# Patient Record
Sex: FEMALE | Race: BLACK OR AFRICAN AMERICAN | NOT HISPANIC OR LATINO | URBAN - METROPOLITAN AREA
[De-identification: names, ages, dates, MRNs, and addresses within clinical notes are randomized per-mention and may not be internally consistent; named-entity substitution may affect disease eponyms.]

---

## 2023-02-03 PROBLEM — G47.00 INSOMNIA: Status: ACTIVE | Noted: 2023-02-03

## 2023-02-03 PROBLEM — R69 SICK: Status: ACTIVE | Noted: 2023-02-03

## 2023-02-03 PROBLEM — R73.09 ABNORMAL GLUCOSE: Status: ACTIVE | Noted: 2023-02-03

## 2023-02-03 PROBLEM — E55.9 VITAMIN D DEFICIENCY: Status: ACTIVE | Noted: 2023-02-03

## 2023-02-03 PROBLEM — J30.9 ALLERGIC RHINITIS: Status: ACTIVE | Noted: 2023-02-03

## 2023-02-03 PROBLEM — J45.909 ASTHMA (HHS-HCC): Status: ACTIVE | Noted: 2023-02-03

## 2023-02-03 PROBLEM — E03.9 HYPOTHYROIDISM: Status: ACTIVE | Noted: 2023-02-03

## 2023-02-03 PROBLEM — J44.9 COPD (CHRONIC OBSTRUCTIVE PULMONARY DISEASE) (MULTI): Status: ACTIVE | Noted: 2023-02-03

## 2023-02-03 PROBLEM — E66.9 CLASS 1 OBESITY WITH BODY MASS INDEX (BMI) OF 34.0 TO 34.9 IN ADULT: Status: ACTIVE | Noted: 2023-02-03

## 2023-02-03 PROBLEM — J45.20 MILD INTERMITTENT ASTHMA WITHOUT COMPLICATION (HHS-HCC): Status: ACTIVE | Noted: 2023-02-03

## 2023-02-03 PROBLEM — E66.811 CLASS 1 OBESITY WITH BODY MASS INDEX (BMI) OF 34.0 TO 34.9 IN ADULT: Status: ACTIVE | Noted: 2023-02-03

## 2023-02-03 PROBLEM — E53.8 VITAMIN B12 DEFICIENCY: Status: ACTIVE | Noted: 2023-02-03

## 2023-02-03 PROBLEM — I10 BENIGN ESSENTIAL HYPERTENSION: Status: ACTIVE | Noted: 2023-02-03

## 2023-02-03 RX ORDER — ACETAMINOPHEN 500 MG
125 TABLET ORAL DAILY
COMMUNITY
End: 2023-03-16 | Stop reason: DRUGHIGH

## 2023-02-03 RX ORDER — LORATADINE 10 MG/1
10 TABLET ORAL DAILY
COMMUNITY
End: 2023-03-16 | Stop reason: ALTCHOICE

## 2023-02-03 RX ORDER — FLUTICASONE FUROATE AND VILANTEROL 100; 25 UG/1; UG/1
1 POWDER RESPIRATORY (INHALATION) DAILY
COMMUNITY
Start: 2021-08-24 | End: 2023-03-16 | Stop reason: ALTCHOICE

## 2023-02-03 RX ORDER — ALBUTEROL SULFATE 90 UG/1
AEROSOL, METERED RESPIRATORY (INHALATION) EVERY 6 HOURS PRN
COMMUNITY
End: 2023-03-16 | Stop reason: ALTCHOICE

## 2023-02-03 RX ORDER — LEVOCETIRIZINE DIHYDROCHLORIDE 5 MG/1
1 TABLET, FILM COATED ORAL DAILY
COMMUNITY
Start: 2016-04-20 | End: 2023-03-16 | Stop reason: SDUPTHER

## 2023-02-03 RX ORDER — ALBUTEROL SULFATE 0.83 MG/ML
2.5 SOLUTION RESPIRATORY (INHALATION)
COMMUNITY
End: 2023-03-16 | Stop reason: ALTCHOICE

## 2023-02-03 RX ORDER — MONTELUKAST SODIUM 10 MG/1
1 TABLET ORAL NIGHTLY
COMMUNITY
Start: 2020-11-30 | End: 2023-03-16 | Stop reason: SDUPTHER

## 2023-02-03 RX ORDER — VERAPAMIL HYDROCHLORIDE 180 MG/1
2 TABLET, FILM COATED, EXTENDED RELEASE ORAL DAILY
COMMUNITY
Start: 2014-04-22 | End: 2023-03-16 | Stop reason: SDUPTHER

## 2023-02-03 RX ORDER — CYANOCOBALAMIN 1000 UG/ML
INJECTION, SOLUTION INTRAMUSCULAR; SUBCUTANEOUS
COMMUNITY
End: 2023-03-16 | Stop reason: ALTCHOICE

## 2023-02-03 RX ORDER — FLUTICASONE PROPIONATE 50 MCG
SPRAY, SUSPENSION (ML) NASAL
COMMUNITY
Start: 2021-08-24 | End: 2023-03-16 | Stop reason: ALTCHOICE

## 2023-02-03 RX ORDER — LEVOTHYROXINE SODIUM 75 UG/1
1 TABLET ORAL DAILY
COMMUNITY
Start: 2021-08-24 | End: 2023-03-16 | Stop reason: SDUPTHER

## 2023-02-03 RX ORDER — BUDESONIDE AND FORMOTEROL FUMARATE DIHYDRATE 80; 4.5 UG/1; UG/1
2 AEROSOL RESPIRATORY (INHALATION)
COMMUNITY
End: 2023-03-16 | Stop reason: DRUGHIGH

## 2023-03-10 LAB
ALANINE AMINOTRANSFERASE (SGPT) (U/L) IN SER/PLAS: 27 U/L (ref 7–45)
ALBUMIN (G/DL) IN SER/PLAS: 4.2 G/DL (ref 3.4–5)
ALKALINE PHOSPHATASE (U/L) IN SER/PLAS: 72 U/L (ref 33–110)
ANION GAP IN SER/PLAS: 9 MMOL/L (ref 10–20)
ASPARTATE AMINOTRANSFERASE (SGOT) (U/L) IN SER/PLAS: 22 U/L (ref 9–39)
BASOPHILS (10*3/UL) IN BLOOD BY AUTOMATED COUNT: 0.04 X10E9/L (ref 0–0.1)
BASOPHILS/100 LEUKOCYTES IN BLOOD BY AUTOMATED COUNT: 0.5 % (ref 0–2)
BILIRUBIN TOTAL (MG/DL) IN SER/PLAS: 0.6 MG/DL (ref 0–1.2)
CALCIDIOL (25 OH VITAMIN D3) (NG/ML) IN SER/PLAS: 45 NG/ML
CALCIUM (MG/DL) IN SER/PLAS: 8.8 MG/DL (ref 8.6–10.3)
CARBON DIOXIDE, TOTAL (MMOL/L) IN SER/PLAS: 32 MMOL/L (ref 21–32)
CHLORIDE (MMOL/L) IN SER/PLAS: 104 MMOL/L (ref 98–107)
CHOLESTEROL (MG/DL) IN SER/PLAS: 215 MG/DL (ref 0–199)
CHOLESTEROL IN HDL (MG/DL) IN SER/PLAS: 63.6 MG/DL
CHOLESTEROL/HDL RATIO: 3.4
COBALAMIN (VITAMIN B12) (PG/ML) IN SER/PLAS: 736 PG/ML (ref 211–911)
CREATININE (MG/DL) IN SER/PLAS: 0.62 MG/DL (ref 0.5–1.05)
EOSINOPHILS (10*3/UL) IN BLOOD BY AUTOMATED COUNT: 0.16 X10E9/L (ref 0–0.7)
EOSINOPHILS/100 LEUKOCYTES IN BLOOD BY AUTOMATED COUNT: 2 % (ref 0–6)
ERYTHROCYTE DISTRIBUTION WIDTH (RATIO) BY AUTOMATED COUNT: 13.8 % (ref 11.5–14.5)
ERYTHROCYTE MEAN CORPUSCULAR HEMOGLOBIN CONCENTRATION (G/DL) BY AUTOMATED: 30 G/DL (ref 32–36)
ERYTHROCYTE MEAN CORPUSCULAR VOLUME (FL) BY AUTOMATED COUNT: 88 FL (ref 80–100)
ERYTHROCYTES (10*6/UL) IN BLOOD BY AUTOMATED COUNT: 4.98 X10E12/L (ref 4–5.2)
GFR FEMALE: >90 ML/MIN/1.73M2
GLUCOSE (MG/DL) IN SER/PLAS: 93 MG/DL (ref 74–99)
HEMATOCRIT (%) IN BLOOD BY AUTOMATED COUNT: 43.7 % (ref 36–46)
HEMOGLOBIN (G/DL) IN BLOOD: 13.1 G/DL (ref 12–16)
IMMATURE GRANULOCYTES/100 LEUKOCYTES IN BLOOD BY AUTOMATED COUNT: 0.4 % (ref 0–0.9)
LDL: 131 MG/DL (ref 0–99)
LEUKOCYTES (10*3/UL) IN BLOOD BY AUTOMATED COUNT: 8.2 X10E9/L (ref 4.4–11.3)
LYMPHOCYTES (10*3/UL) IN BLOOD BY AUTOMATED COUNT: 1.77 X10E9/L (ref 1.2–4.8)
LYMPHOCYTES/100 LEUKOCYTES IN BLOOD BY AUTOMATED COUNT: 21.7 % (ref 13–44)
MONOCYTES (10*3/UL) IN BLOOD BY AUTOMATED COUNT: 0.5 X10E9/L (ref 0.1–1)
MONOCYTES/100 LEUKOCYTES IN BLOOD BY AUTOMATED COUNT: 6.1 % (ref 2–10)
NEUTROPHILS (10*3/UL) IN BLOOD BY AUTOMATED COUNT: 5.65 X10E9/L (ref 1.2–7.7)
NEUTROPHILS/100 LEUKOCYTES IN BLOOD BY AUTOMATED COUNT: 69.3 % (ref 40–80)
PLATELETS (10*3/UL) IN BLOOD AUTOMATED COUNT: 195 X10E9/L (ref 150–450)
POTASSIUM (MMOL/L) IN SER/PLAS: 4.1 MMOL/L (ref 3.5–5.3)
PROTEIN TOTAL: 6.8 G/DL (ref 6.4–8.2)
SODIUM (MMOL/L) IN SER/PLAS: 141 MMOL/L (ref 136–145)
THYROTROPIN (MIU/L) IN SER/PLAS BY DETECTION LIMIT <= 0.05 MIU/L: 1.11 MIU/L (ref 0.44–3.98)
TRIGLYCERIDE (MG/DL) IN SER/PLAS: 102 MG/DL (ref 0–149)
UREA NITROGEN (MG/DL) IN SER/PLAS: 10 MG/DL (ref 6–23)
VLDL: 20 MG/DL (ref 0–40)

## 2023-03-16 ENCOUNTER — OFFICE VISIT (OUTPATIENT)
Dept: PRIMARY CARE | Facility: CLINIC | Age: 53
End: 2023-03-16
Payer: COMMERCIAL

## 2023-03-16 VITALS
WEIGHT: 196 LBS | TEMPERATURE: 97.3 F | BODY MASS INDEX: 33.46 KG/M2 | SYSTOLIC BLOOD PRESSURE: 122 MMHG | HEART RATE: 76 BPM | OXYGEN SATURATION: 95 % | RESPIRATION RATE: 16 BRPM | HEIGHT: 64 IN | DIASTOLIC BLOOD PRESSURE: 78 MMHG

## 2023-03-16 DIAGNOSIS — J45.20 MILD INTERMITTENT ASTHMA WITHOUT COMPLICATION (HHS-HCC): ICD-10-CM

## 2023-03-16 DIAGNOSIS — E03.9 ACQUIRED HYPOTHYROIDISM: ICD-10-CM

## 2023-03-16 DIAGNOSIS — J30.1 ALLERGIC RHINITIS DUE TO POLLEN, UNSPECIFIED SEASONALITY: ICD-10-CM

## 2023-03-16 DIAGNOSIS — E66.09 CLASS 1 OBESITY DUE TO EXCESS CALORIES WITH SERIOUS COMORBIDITY AND BODY MASS INDEX (BMI) OF 33.0 TO 33.9 IN ADULT: ICD-10-CM

## 2023-03-16 DIAGNOSIS — E78.5 HYPERLIPIDEMIA WITH TARGET LOW DENSITY LIPOPROTEIN (LDL) CHOLESTEROL LESS THAN 100 MG/DL: ICD-10-CM

## 2023-03-16 DIAGNOSIS — E74.39 GLUCOSE INTOLERANCE: ICD-10-CM

## 2023-03-16 DIAGNOSIS — L28.0 LICHENOID DERMATITIS: ICD-10-CM

## 2023-03-16 DIAGNOSIS — E55.9 VITAMIN D DEFICIENCY: ICD-10-CM

## 2023-03-16 DIAGNOSIS — E53.8 VITAMIN B12 DEFICIENCY: ICD-10-CM

## 2023-03-16 DIAGNOSIS — I10 BENIGN ESSENTIAL HYPERTENSION: Primary | ICD-10-CM

## 2023-03-16 PROBLEM — G47.00 INSOMNIA: Status: RESOLVED | Noted: 2023-02-03 | Resolved: 2023-03-16

## 2023-03-16 PROBLEM — E66.811 CLASS 1 OBESITY WITH BODY MASS INDEX (BMI) OF 34.0 TO 34.9 IN ADULT: Status: RESOLVED | Noted: 2023-02-03 | Resolved: 2023-03-16

## 2023-03-16 PROBLEM — R73.09 ABNORMAL GLUCOSE: Status: RESOLVED | Noted: 2023-02-03 | Resolved: 2023-03-16

## 2023-03-16 PROBLEM — J44.9 COPD (CHRONIC OBSTRUCTIVE PULMONARY DISEASE) (MULTI): Status: RESOLVED | Noted: 2023-02-03 | Resolved: 2023-03-16

## 2023-03-16 PROBLEM — E66.9 CLASS 1 OBESITY WITH BODY MASS INDEX (BMI) OF 34.0 TO 34.9 IN ADULT: Status: RESOLVED | Noted: 2023-02-03 | Resolved: 2023-03-16

## 2023-03-16 PROBLEM — R69 SICK: Status: RESOLVED | Noted: 2023-02-03 | Resolved: 2023-03-16

## 2023-03-16 LAB — POC HEMOGLOBIN A1C: 5.3 % (ref 4.2–6.5)

## 2023-03-16 PROCEDURE — 99214 OFFICE O/P EST MOD 30 MIN: CPT | Performed by: FAMILY MEDICINE

## 2023-03-16 PROCEDURE — 3078F DIAST BP <80 MM HG: CPT | Performed by: FAMILY MEDICINE

## 2023-03-16 PROCEDURE — 83036 HEMOGLOBIN GLYCOSYLATED A1C: CPT | Performed by: FAMILY MEDICINE

## 2023-03-16 PROCEDURE — 3008F BODY MASS INDEX DOCD: CPT | Performed by: FAMILY MEDICINE

## 2023-03-16 PROCEDURE — 3074F SYST BP LT 130 MM HG: CPT | Performed by: FAMILY MEDICINE

## 2023-03-16 RX ORDER — VERAPAMIL HYDROCHLORIDE 180 MG/1
360 TABLET, FILM COATED, EXTENDED RELEASE ORAL NIGHTLY
Qty: 180 TABLET | Refills: 1 | Status: SHIPPED | OUTPATIENT
Start: 2023-03-16 | End: 2023-11-27

## 2023-03-16 RX ORDER — LEVOTHYROXINE SODIUM 75 UG/1
75 TABLET ORAL
Qty: 90 TABLET | Refills: 1 | Status: SHIPPED | OUTPATIENT
Start: 2023-03-16 | End: 2023-11-29

## 2023-03-16 RX ORDER — LEVOCETIRIZINE DIHYDROCHLORIDE 5 MG/1
5 TABLET, FILM COATED ORAL EVERY EVENING
Qty: 90 TABLET | Refills: 1 | Status: SHIPPED | OUTPATIENT
Start: 2023-03-16 | End: 2023-11-29

## 2023-03-16 RX ORDER — MONTELUKAST SODIUM 10 MG/1
10 TABLET ORAL NIGHTLY
Qty: 90 TABLET | Refills: 1 | Status: SHIPPED | OUTPATIENT
Start: 2023-03-16 | End: 2023-11-29

## 2023-03-16 RX ORDER — CLOBETASOL PROPIONATE 0.5 MG/G
OINTMENT TOPICAL 2 TIMES DAILY
COMMUNITY
Start: 2023-01-16 | End: 2023-11-07 | Stop reason: ALTCHOICE

## 2023-03-16 RX ORDER — BUDESONIDE AND FORMOTEROL FUMARATE DIHYDRATE 80; 4.5 UG/1; UG/1
2 AEROSOL RESPIRATORY (INHALATION)
Qty: 1 EACH | Status: SHIPPED | COMMUNITY
Start: 2023-03-16

## 2023-03-16 RX ORDER — ACETAMINOPHEN 500 MG
5000 TABLET ORAL DAILY
Status: SHIPPED | COMMUNITY
Start: 2023-03-16

## 2023-03-16 ASSESSMENT — ENCOUNTER SYMPTOMS
EYE PAIN: 0
WOUND: 0
BLOOD IN STOOL: 0
CONSTIPATION: 0
ABDOMINAL PAIN: 0
ADENOPATHY: 0
VOMITING: 0
COUGH: 0
SINUS PAIN: 0
CHEST TIGHTNESS: 0
HEMATURIA: 0
APPETITE CHANGE: 0
EYE REDNESS: 0
OCCASIONAL FEELINGS OF UNSTEADINESS: 0
WHEEZING: 0
DEPRESSION: 0
JOINT SWELLING: 0
SINUS PRESSURE: 0
DIZZINESS: 0
DYSURIA: 0
COLOR CHANGE: 0
PALPITATIONS: 0
BRUISES/BLEEDS EASILY: 0
ARTHRALGIAS: 0
POLYPHAGIA: 0
POLYDIPSIA: 0
FACIAL SWELLING: 0
MYALGIAS: 0
ACTIVITY CHANGE: 0
BACK PAIN: 0
LOSS OF SENSATION IN FEET: 0
VOICE CHANGE: 0
UNEXPECTED WEIGHT CHANGE: 0
RHINORRHEA: 0
CHILLS: 0
NERVOUS/ANXIOUS: 0
FATIGUE: 0
SLEEP DISTURBANCE: 0
AGITATION: 0
NECK STIFFNESS: 0
EYE DISCHARGE: 0
NAUSEA: 0
EYE ITCHING: 0
SORE THROAT: 0
FREQUENCY: 0
DIARRHEA: 0
TROUBLE SWALLOWING: 0
SHORTNESS OF BREATH: 0
FEVER: 0
FLANK PAIN: 0
DIAPHORESIS: 0

## 2023-03-16 ASSESSMENT — PATIENT HEALTH QUESTIONNAIRE - PHQ9
SUM OF ALL RESPONSES TO PHQ9 QUESTIONS 1 AND 2: 0
2. FEELING DOWN, DEPRESSED OR HOPELESS: NOT AT ALL
1. LITTLE INTEREST OR PLEASURE IN DOING THINGS: NOT AT ALL

## 2023-03-16 NOTE — PROGRESS NOTES
"Subjective   Patient ID: Bren Johnson is a 52 y.o. female who presents for 6 month follow up  (Medication review/).  Today she is accompanied by alone.     HPI  Subjective:   Bren Johnson is a 52 y.o. female with hypertension and hyperlipidemia.     Current Outpatient Medications   Medication Sig Dispense Refill    clobetasol (Temovate) 0.05 % ointment Apply topically 2 times a day.      budesonide-formoteroL (Symbicort) 80-4.5 mcg/actuation inhaler Inhale 2 puffs  in the morning and 2 puffs before bedtime. 1 each     cholecalciferol (Vitamin D-3) 5,000 Units tablet Take 1 tablet (5,000 Units) by mouth once daily.      levocetirizine (Xyzal) 5 mg tablet Take 1 tablet (5 mg) by mouth once daily in the evening. 90 tablet 1    levothyroxine (Synthroid, Levoxyl) 75 mcg tablet Take 1 tablet (75 mcg) by mouth once daily in the morning. Take before meals. 90 tablet 1    montelukast (Singulair) 10 mg tablet Take 1 tablet (10 mg) by mouth once daily at bedtime. 90 tablet 1    verapamil SR (Calan-SR) 180 mg ER tablet Take 2 tablets (360 mg) by mouth once daily at bedtime. 180 tablet 1     No current facility-administered medications for this visit.      Hypertension ROS: taking medications as instructed, no medication side effects noted, no TIA's, no chest pain on exertion, no dyspnea on exertion, no swelling of ankles, no orthostatic dizziness or lightheadedness, no orthopnea or paroxysmal nocturnal dyspnea, and no palpitations.   New concerns: none.     Objective:   /78   Pulse 76   Temp 36.3 °C (97.3 °F)   Resp 16   Ht 1.626 m (5' 4\")   Wt 88.9 kg (196 lb)   SpO2 95%   BMI 33.64 kg/m²       Review of Systems   Constitutional:  Negative for activity change, appetite change, chills, diaphoresis, fatigue, fever and unexpected weight change.   HENT:  Negative for congestion, dental problem, drooling, ear discharge, ear pain, facial swelling, hearing loss, nosebleeds, postnasal drip, rhinorrhea, sinus pressure, " "sinus pain, sneezing, sore throat, tinnitus, trouble swallowing and voice change.    Eyes:  Negative for pain, discharge, redness, itching and visual disturbance.   Respiratory:  Negative for cough, chest tightness, shortness of breath and wheezing.    Cardiovascular:  Negative for chest pain, palpitations and leg swelling.   Gastrointestinal:  Negative for abdominal pain, blood in stool, constipation, diarrhea, nausea and vomiting.   Endocrine: Negative for cold intolerance, heat intolerance, polydipsia, polyphagia and polyuria.   Genitourinary:  Negative for decreased urine volume, dysuria, flank pain, frequency, hematuria and urgency.   Musculoskeletal:  Negative for arthralgias, back pain, gait problem, joint swelling, myalgias and neck stiffness.   Skin:  Negative for color change, pallor, rash and wound.   Neurological:  Negative for dizziness.   Hematological:  Negative for adenopathy. Does not bruise/bleed easily.   Psychiatric/Behavioral:  Negative for agitation, behavioral problems and sleep disturbance. The patient is not nervous/anxious.    All other systems reviewed and are negative.      Objective   /78   Pulse 76   Temp 36.3 °C (97.3 °F)   Resp 16   Ht 1.626 m (5' 4\")   Wt 88.9 kg (196 lb)   SpO2 95%   BMI 33.64 kg/m²   BSA: 2 meters squared  Growth percentiles: Facility age limit for growth %david is 20 years. Facility age limit for growth %david is 20 years.     Physical Exam  Vitals and nursing note reviewed.   Constitutional:       General: She is not in acute distress.     Appearance: Normal appearance. She is normal weight. She is not ill-appearing or toxic-appearing.   HENT:      Head: Normocephalic.      Right Ear: Tympanic membrane, ear canal and external ear normal. There is no impacted cerumen.      Left Ear: Tympanic membrane, ear canal and external ear normal. There is no impacted cerumen.      Nose: Nose normal. No congestion or rhinorrhea.      Mouth/Throat:      Mouth: " Mucous membranes are moist.      Pharynx: Oropharynx is clear. No oropharyngeal exudate or posterior oropharyngeal erythema.   Eyes:      General: No scleral icterus.        Right eye: No discharge.         Left eye: No discharge.      Extraocular Movements: Extraocular movements intact.      Conjunctiva/sclera: Conjunctivae normal.      Pupils: Pupils are equal, round, and reactive to light.   Neck:      Vascular: No carotid bruit.   Cardiovascular:      Rate and Rhythm: Normal rate and regular rhythm.      Pulses: Normal pulses.      Heart sounds: Normal heart sounds. No murmur heard.     No gallop.   Pulmonary:      Effort: No respiratory distress.      Breath sounds: No wheezing or rhonchi.   Chest:      Chest wall: No tenderness.   Abdominal:      General: Abdomen is flat. Bowel sounds are normal.      Palpations: Abdomen is soft. There is no mass.      Tenderness: There is no abdominal tenderness. There is no guarding or rebound.      Hernia: No hernia is present.   Musculoskeletal:         General: No swelling or tenderness. Normal range of motion.      Cervical back: Normal range of motion. No rigidity or tenderness.      Right lower leg: No edema.      Left lower leg: No edema.   Lymphadenopathy:      Cervical: No cervical adenopathy.   Skin:     General: Skin is warm and dry.      Coloration: Skin is not pale.      Findings: No bruising, erythema or rash.   Neurological:      General: No focal deficit present.      Mental Status: She is alert and oriented to person, place, and time.      Sensory: No sensory deficit.      Coordination: Coordination normal.      Gait: Gait normal.      Deep Tendon Reflexes: Reflexes normal.   Psychiatric:         Mood and Affect: Mood normal.         Behavior: Behavior normal.         Thought Content: Thought content normal.         Judgment: Judgment normal.         Assessment/Plan   Problem List Items Addressed This Visit       Allergic rhinitis    Relevant Medications     levocetirizine (Xyzal) 5 mg tablet    Benign essential hypertension - Primary    Relevant Medications    verapamil SR (Calan-SR) 180 mg ER tablet    Other Relevant Orders    Follow Up In [facility] - PCP    Hypothyroidism    Relevant Medications    levothyroxine (Synthroid, Levoxyl) 75 mcg tablet    Other Relevant Orders    Follow Up In [facility] - PCP    Mild intermittent asthma without complication    Relevant Medications    budesonide-formoteroL (Symbicort) 80-4.5 mcg/actuation inhaler    montelukast (Singulair) 10 mg tablet    Vitamin D deficiency    Relevant Medications    cholecalciferol (Vitamin D-3) 5,000 Units tablet    Vitamin B12 deficiency     Other Visit Diagnoses       Hyperlipidemia with target low density lipoprotein (LDL) cholesterol less than 100 mg/dL        Glucose intolerance        Relevant Orders    POCT glycosylated hemoglobin (Hb A1C) manually resulted (Completed)    Lichenoid dermatitis        Relevant Medications    clobetasol (Temovate) 0.05 % ointment    Class 1 obesity due to excess calories with serious comorbidity and body mass index (BMI) of 33.0 to 33.9 in adult            Explained importance of medications and cholesterol control. She would like to continue diet control and exercise.

## 2023-03-16 NOTE — PATIENT INSTRUCTIONS
March 16, 2023    Bren Johnson  [address] [facility] [address]      Dear Ms. Johnson:     To help you more effectively manage your high blood pressure, we would like to remind you of the following preventive measures you can take at home:    1) Eat right: Your diet should be rich in fruits, vegetables, potassium, and low-fat dairy products. You should also reduce your intake of sodium and fats, particularly saturated fats.    2) Maintain a healthy weight: Try to achieve and maintain a healthy weight. If you are unsure what this means for you, please contact our clinic.    3) Exercise: Try to get at least 30 minutes of aerobic exercise every day.    4) Moderate your alcohol consumption: Limit your alcohol intake to one drink per day.    5) Monitor your blood pressure: You should check your blood pressure regularly. Notify our clinic if your blood pressure readings are consistently higher than recommended.    We have included a personalized hypertension report card on the following page that lists your most recent blood pressure readings and lab results, along with your ideal values. If you have any questions or concerns about these instructions, your results, or your improving health, please don't hesitate to call.    Thank you for including us as members of your health care team.    [unfilled]    Sincerely,         Joyce Corey MD    Enclosure      Hypertension Report Card for Bren Johnson  March 16, 2023:     Below is a summary of recent tests related to your hypertension that can help you manage your health.     Blood Pressure:   Normal blood pressure values are 120/80 or lower. Your blood pressure values should be less than 140/90. If your readings at home are consistently higher than this, please contact me.     Your most recent blood pressure readings at our clinic were:   BP Readings from Last 3 Encounters:   03/16/23 122/78   09/22/22 118/80   03/24/22 120/88       Creatinine:   High blood  pressure can cause a loss of kidney function. Creatinine is a measure of this kidney function.      Your most recent creatinine levels were:   Creatinine (mg/dL)   Date Value   03/10/2023 0.62   12/20/2022 0.56   02/09/2022 0.66           Potassium:  Potassium is an electrolyte in your blood that can be affected by blood pressure treatment.      Your most recent potassium levels were:  Potassium (mmol/L)   Date Value   03/10/2023 4.1   12/20/2022 3.6   02/09/2022 3.8

## 2023-05-16 ENCOUNTER — TELEPHONE (OUTPATIENT)
Dept: PRIMARY CARE | Facility: CLINIC | Age: 53
End: 2023-05-16
Payer: COMMERCIAL

## 2023-05-16 DIAGNOSIS — Z29.89 NEED FOR MALARIA PROPHYLAXIS: Primary | ICD-10-CM

## 2023-05-16 RX ORDER — CHLOROQUINE PHOSPHATE 500 MG/1
TABLET, COATED ORAL
Qty: 14 TABLET | Refills: 0 | Status: SHIPPED | OUTPATIENT
Start: 2023-05-16 | End: 2023-09-26 | Stop reason: ALTCHOICE

## 2023-05-16 RX ORDER — CHLOROQUINE PHOSPHATE 500 MG/1
TABLET, COATED ORAL
Qty: 30 TABLET | Refills: 0 | Status: CANCELLED | OUTPATIENT
Start: 2023-05-16

## 2023-09-26 ENCOUNTER — OFFICE VISIT (OUTPATIENT)
Dept: PRIMARY CARE | Facility: CLINIC | Age: 53
End: 2023-09-26
Payer: COMMERCIAL

## 2023-09-26 VITALS
WEIGHT: 200.25 LBS | DIASTOLIC BLOOD PRESSURE: 96 MMHG | OXYGEN SATURATION: 94 % | BODY MASS INDEX: 34.37 KG/M2 | HEART RATE: 82 BPM | SYSTOLIC BLOOD PRESSURE: 138 MMHG

## 2023-09-26 DIAGNOSIS — Z23 NEED FOR IMMUNIZATION AGAINST INFLUENZA: ICD-10-CM

## 2023-09-26 DIAGNOSIS — J45.20 MILD INTERMITTENT ASTHMA WITHOUT COMPLICATION (HHS-HCC): ICD-10-CM

## 2023-09-26 DIAGNOSIS — I10 BENIGN ESSENTIAL HYPERTENSION: Primary | ICD-10-CM

## 2023-09-26 DIAGNOSIS — E03.9 ACQUIRED HYPOTHYROIDISM: ICD-10-CM

## 2023-09-26 DIAGNOSIS — R13.10 DYSPHAGIA, UNSPECIFIED TYPE: ICD-10-CM

## 2023-09-26 DIAGNOSIS — J30.1 ALLERGIC RHINITIS DUE TO POLLEN, UNSPECIFIED SEASONALITY: ICD-10-CM

## 2023-09-26 PROCEDURE — 3075F SYST BP GE 130 - 139MM HG: CPT | Performed by: FAMILY MEDICINE

## 2023-09-26 PROCEDURE — 3008F BODY MASS INDEX DOCD: CPT | Performed by: FAMILY MEDICINE

## 2023-09-26 PROCEDURE — 90686 IIV4 VACC NO PRSV 0.5 ML IM: CPT | Performed by: FAMILY MEDICINE

## 2023-09-26 PROCEDURE — 99214 OFFICE O/P EST MOD 30 MIN: CPT | Performed by: FAMILY MEDICINE

## 2023-09-26 PROCEDURE — 3080F DIAST BP >= 90 MM HG: CPT | Performed by: FAMILY MEDICINE

## 2023-09-26 PROCEDURE — 90471 IMMUNIZATION ADMIN: CPT | Performed by: FAMILY MEDICINE

## 2023-09-26 PROCEDURE — 1036F TOBACCO NON-USER: CPT | Performed by: FAMILY MEDICINE

## 2023-09-26 RX ORDER — HYDROCHLOROTHIAZIDE 12.5 MG/1
12.5 TABLET ORAL DAILY
Qty: 90 TABLET | Refills: 0 | Status: SHIPPED | OUTPATIENT
Start: 2023-09-26 | End: 2023-12-23 | Stop reason: SDUPTHER

## 2023-09-26 ASSESSMENT — ENCOUNTER SYMPTOMS
FEVER: 0
APPETITE CHANGE: 0
SHORTNESS OF BREATH: 0
ADENOPATHY: 0
AGITATION: 0
EYE DISCHARGE: 0
DIAPHORESIS: 0
DIZZINESS: 0
FATIGUE: 0
NAUSEA: 0
SORE THROAT: 0
RHINORRHEA: 0
CHILLS: 0
TROUBLE SWALLOWING: 0
EYE REDNESS: 0
NECK STIFFNESS: 0
POLYDIPSIA: 0
CONSTIPATION: 0
WHEEZING: 0
ABDOMINAL PAIN: 0
DYSURIA: 0
ACTIVITY CHANGE: 0
FLANK PAIN: 0
FREQUENCY: 0
UNEXPECTED WEIGHT CHANGE: 0
EYE ITCHING: 0
BRUISES/BLEEDS EASILY: 0
COLOR CHANGE: 0
EYE PAIN: 0
SINUS PAIN: 0
POLYPHAGIA: 0
BLOOD IN STOOL: 0
SINUS PRESSURE: 0
SLEEP DISTURBANCE: 0
ARTHRALGIAS: 0
DIARRHEA: 0
COUGH: 0
HEMATURIA: 0
BACK PAIN: 0
VOICE CHANGE: 0
MYALGIAS: 0
NERVOUS/ANXIOUS: 0
WOUND: 0
JOINT SWELLING: 0
VOMITING: 0
PALPITATIONS: 0
CHEST TIGHTNESS: 0
FACIAL SWELLING: 0

## 2023-09-26 NOTE — PROGRESS NOTES
Subjective   Patient ID: Bren Johnson is a 53 y.o. female who presents for Follow-up.  Today she is accompanied by alone.     HPI  Subjective:   Bren Johnson is a 53 y.o. female with hypertension.  Current Outpatient Medications   Medication Sig Dispense Refill    cholecalciferol (Vitamin D-3) 5,000 Units tablet Take 1 tablet (5,000 Units) by mouth once daily.      levocetirizine (Xyzal) 5 mg tablet Take 1 tablet (5 mg) by mouth once daily in the evening. 90 tablet 1    levothyroxine (Synthroid, Levoxyl) 75 mcg tablet Take 1 tablet (75 mcg) by mouth once daily in the morning. Take before meals. 90 tablet 1    montelukast (Singulair) 10 mg tablet Take 1 tablet (10 mg) by mouth once daily at bedtime. 90 tablet 1    verapamil SR (Calan-SR) 180 mg ER tablet Take 2 tablets (360 mg) by mouth once daily at bedtime. 180 tablet 1    budesonide-formoteroL (Symbicort) 80-4.5 mcg/actuation inhaler Inhale 2 puffs 2 times a day. 1 each     clobetasol (Temovate) 0.05 % ointment Apply topically 2 times a day.      hydroCHLOROthiazide (HYDRODiuril) 12.5 mg tablet Take 1 tablet (12.5 mg) by mouth once daily. 90 tablet 0     No current facility-administered medications for this visit.      Hypertension ROS: taking medications as instructed, no medication side effects noted, no TIA's, no chest pain on exertion, no dyspnea on exertion, no swelling of ankles, and no palpitations.   New concerns: None.     Objective:   Blood Pressure (Abnormal) 138/96   Pulse 82   Weight 90.8 kg (200 lb 4 oz)   Oxygen Saturation 94%   Body Mass Index 34.37 kg/m²        Review of Systems   Constitutional:  Negative for activity change, appetite change, chills, diaphoresis, fatigue, fever and unexpected weight change.   HENT:  Positive for congestion. Negative for dental problem, drooling, ear discharge, ear pain, facial swelling, hearing loss, nosebleeds, postnasal drip, rhinorrhea, sinus pressure, sinus pain, sneezing, sore throat, tinnitus,  trouble swallowing and voice change.    Eyes:  Negative for pain, discharge, redness, itching and visual disturbance.   Respiratory:  Negative for cough, chest tightness, shortness of breath and wheezing.    Cardiovascular:  Negative for chest pain, palpitations and leg swelling.   Gastrointestinal:  Negative for abdominal pain, blood in stool, constipation, diarrhea, nausea and vomiting.   Endocrine: Negative for cold intolerance, heat intolerance, polydipsia, polyphagia and polyuria.   Genitourinary:  Negative for decreased urine volume, dysuria, flank pain, frequency, hematuria and urgency.   Musculoskeletal:  Negative for arthralgias, back pain, gait problem, joint swelling, myalgias and neck stiffness.   Skin:  Negative for color change, pallor, rash and wound.   Allergic/Immunologic: Positive for environmental allergies.   Neurological:  Negative for dizziness.   Hematological:  Negative for adenopathy. Does not bruise/bleed easily.   Psychiatric/Behavioral:  Negative for agitation, behavioral problems and sleep disturbance. The patient is not nervous/anxious.    All other systems reviewed and are negative.      Objective   Blood Pressure (Abnormal) 138/96   Pulse 82   Weight 90.8 kg (200 lb 4 oz)   Oxygen Saturation 94%   Body Mass Index 34.37 kg/m²   BSA: 2.02 meters squared  Growth percentiles: Facility age limit for growth %david is 20 years. Facility age limit for growth %david is 20 years.     Physical Exam  Vitals and nursing note reviewed. Exam conducted with a chaperone present.   Constitutional:       General: She is not in acute distress.     Appearance: Normal appearance. She is obese. She is not ill-appearing, toxic-appearing or diaphoretic.   HENT:      Head: Normocephalic.      Right Ear: Tympanic membrane, ear canal and external ear normal. There is no impacted cerumen.      Left Ear: Tympanic membrane, ear canal and external ear normal. There is no impacted cerumen.      Nose: Nose normal.  No congestion or rhinorrhea.      Mouth/Throat:      Mouth: Mucous membranes are moist.      Pharynx: Oropharynx is clear. No oropharyngeal exudate or posterior oropharyngeal erythema.   Eyes:      Extraocular Movements: Extraocular movements intact.      Conjunctiva/sclera: Conjunctivae normal.      Pupils: Pupils are equal, round, and reactive to light.   Neck:      Vascular: No carotid bruit.   Cardiovascular:      Rate and Rhythm: Normal rate and regular rhythm.      Pulses: Normal pulses.      Heart sounds: Normal heart sounds. No murmur heard.     No friction rub. No gallop.   Pulmonary:      Effort: Pulmonary effort is normal. No respiratory distress.      Breath sounds: Normal breath sounds. No stridor. No wheezing, rhonchi or rales.   Chest:      Chest wall: No tenderness.   Musculoskeletal:         General: Normal range of motion.      Cervical back: Normal range of motion and neck supple. No rigidity or tenderness.      Right lower leg: Edema present.      Left lower leg: Edema present.   Lymphadenopathy:      Cervical: No cervical adenopathy.   Skin:     General: Skin is warm.      Capillary Refill: Capillary refill takes less than 2 seconds.   Neurological:      Mental Status: She is alert.   Psychiatric:         Mood and Affect: Mood normal.         Behavior: Behavior normal.         Thought Content: Thought content normal.         Judgment: Judgment normal.         Assessment/Plan   Problem List Items Addressed This Visit             ICD-10-CM    Allergic rhinitis J30.9    Benign essential hypertension - Primary I10    Relevant Medications    hydroCHLOROthiazide (HYDRODiuril) 12.5 mg tablet    Hypothyroidism E03.9    Mild intermittent asthma without complication J45.20    Dysphagia R13.10, R13.1     Other Visit Diagnoses       Diagnosis Codes    Need for immunization against influenza     Z23    Relevant Orders    Flu vaccine (IIV4) age 6 months and greater, preservative free          Current  Outpatient Medications   Medication Sig Dispense Refill    cholecalciferol (Vitamin D-3) 5,000 Units tablet Take 1 tablet (5,000 Units) by mouth once daily.      levocetirizine (Xyzal) 5 mg tablet Take 1 tablet (5 mg) by mouth once daily in the evening. 90 tablet 1    levothyroxine (Synthroid, Levoxyl) 75 mcg tablet Take 1 tablet (75 mcg) by mouth once daily in the morning. Take before meals. 90 tablet 1    montelukast (Singulair) 10 mg tablet Take 1 tablet (10 mg) by mouth once daily at bedtime. 90 tablet 1    verapamil SR (Calan-SR) 180 mg ER tablet Take 2 tablets (360 mg) by mouth once daily at bedtime. 180 tablet 1    budesonide-formoteroL (Symbicort) 80-4.5 mcg/actuation inhaler Inhale 2 puffs 2 times a day. 1 each     clobetasol (Temovate) 0.05 % ointment Apply topically 2 times a day.      hydroCHLOROthiazide (HYDRODiuril) 12.5 mg tablet Take 1 tablet (12.5 mg) by mouth once daily. 90 tablet 0     No current facility-administered medications for this visit.     F/U in Jan for PE and HTN  Dear Ms. Olearytra:     To help you more effectively manage your high blood pressure, we would like to remind you of the following preventive measures you can take at home:    1) Eat right: Your diet should be rich in fruits, vegetables, potassium, and low-fat dairy products. You should also reduce your intake of sodium and fats, particularly saturated fats.    2) Maintain a healthy weight: Try to achieve and maintain a healthy weight. If you are unsure what this means for you, please contact our clinic.    3) Exercise: Try to get at least 30 minutes of aerobic exercise every day.    4) Moderate your alcohol consumption: Limit your alcohol intake to one drink per day.    5) Monitor your blood pressure: You should check your blood pressure regularly. Notify our clinic if your blood pressure readings are consistently higher than recommended.    We have included a personalized hypertension report card on the following page that  lists your most recent blood pressure readings and lab results, along with your ideal values. If you have any questions or concerns about these instructions, your results, or your improving health, please don't hesitate to call.    Thank you for including us as members of your health care team.    [unfilled]    Sincerely,         Joyce Corey MD    Enclosure      Hypertension Report Card for Bren Johnson  September 26, 2023:     Below is a summary of recent tests related to your hypertension that can help you manage your health.     Blood Pressure:   Normal blood pressure values are 120/80 or lower. Your blood pressure values should be less than 140/90. If your readings at home are consistently higher than this, please contact me.     Your most recent blood pressure readings at our clinic were:   BP Readings from Last 3 Encounters:   09/26/23 (Abnormal) 138/96   03/16/23 122/78   09/22/22 118/80       Creatinine:   High blood pressure can cause a loss of kidney function. Creatinine is a measure of this kidney function.      Your most recent creatinine levels were:   Creatinine (mg/dL)   Date Value   03/10/2023 0.62   12/20/2022 0.56   02/09/2022 0.66           Potassium:  Potassium is an electrolyte in your blood that can be affected by blood pressure treatment.     Your most recent potassium levels were:  Potassium (mmol/L)   Date Value   03/10/2023 4.1   12/20/2022 3.6   02/09/2022 3.8

## 2023-09-26 NOTE — PATIENT INSTRUCTIONS
F/U in Jan for PE and HTN  Dear Ms. Johnson:     To help you more effectively manage your high blood pressure, we would like to remind you of the following preventive measures you can take at home:    1) Eat right: Your diet should be rich in fruits, vegetables, potassium, and low-fat dairy products. You should also reduce your intake of sodium and fats, particularly saturated fats.    2) Maintain a healthy weight: Try to achieve and maintain a healthy weight. If you are unsure what this means for you, please contact our clinic.    3) Exercise: Try to get at least 30 minutes of aerobic exercise every day.    4) Moderate your alcohol consumption: Limit your alcohol intake to one drink per day.    5) Monitor your blood pressure: You should check your blood pressure regularly. Notify our clinic if your blood pressure readings are consistently higher than recommended.    We have included a personalized hypertension report card on the following page that lists your most recent blood pressure readings and lab results, along with your ideal values. If you have any questions or concerns about these instructions, your results, or your improving health, please don't hesitate to call.    Thank you for including us as members of your health care team.    [unfilled]    Sincerely,         Joyce Corey MD    Enclosure      Hypertension Report Card for Bren Johnson  September 26, 2023:     Below is a summary of recent tests related to your hypertension that can help you manage your health.     Blood Pressure:   Normal blood pressure values are 120/80 or lower. Your blood pressure values should be less than 140/90. If your readings at home are consistently higher than this, please contact me.     Your most recent blood pressure readings at our clinic were:   BP Readings from Last 3 Encounters:   09/26/23 (Abnormal) 138/96   03/16/23 122/78   09/22/22 118/80       Creatinine:   High blood pressure can cause a loss of kidney  "function. Creatinine is a measure of this kidney function.      Your most recent creatinine levels were:   Creatinine (mg/dL)   Date Value   03/10/2023 0.62   12/20/2022 0.56   02/09/2022 0.66           Potassium:  Potassium is an electrolyte in your blood that can be affected by blood pressure treatment.     Your most recent potassium levels were:  Potassium (mmol/L)   Date Value   03/10/2023 4.1   12/20/2022 3.6   02/09/2022 3.8     Current Outpatient Medications   Medication Sig Dispense Refill    cholecalciferol (Vitamin D-3) 5,000 Units tablet Take 1 tablet (5,000 Units) by mouth once daily.      levocetirizine (Xyzal) 5 mg tablet Take 1 tablet (5 mg) by mouth once daily in the evening. 90 tablet 1    levothyroxine (Synthroid, Levoxyl) 75 mcg tablet Take 1 tablet (75 mcg) by mouth once daily in the morning. Take before meals. 90 tablet 1    montelukast (Singulair) 10 mg tablet Take 1 tablet (10 mg) by mouth once daily at bedtime. 90 tablet 1    verapamil SR (Calan-SR) 180 mg ER tablet Take 2 tablets (360 mg) by mouth once daily at bedtime. 180 tablet 1    budesonide-formoteroL (Symbicort) 80-4.5 mcg/actuation inhaler Inhale 2 puffs 2 times a day. 1 each     clobetasol (Temovate) 0.05 % ointment Apply topically 2 times a day.      hydroCHLOROthiazide (HYDRODiuril) 12.5 mg tablet Take 1 tablet (12.5 mg) by mouth once daily. 90 tablet 0     No current facility-administered medications for this visit.     \"1-7-7-Almost None!\"  Healthy Habits Start Early    EAT 5 OR MORE SERVINGS OF VEGETABLES AND FRUITS EVERY DAY.    Help Bren get three vegetables and two fruits each day. Red, green, yellow, orange...encourage them to try all the colors so they can enjoy different flavors and get more vitamins.    How can I help Bren do this?  ---------------------------------------------  -BE PATIENT WITH Bren, remember it may take 10 times before they start to like new food. So, start with small bites and just keep " trying.  -Serve at least one vegetable or fruit at every meal. Even try two. Remember, portions do not have to be as big as you think.  -Encourage eating fruits and vegetables instead of drinking them..it's a better way to get fiber and vitamins..so limit the amount of juice to 1/2 cup per day for children 1-6 years and one cup per day for children 7-18 years of age. Try using 1/2 part water and 1/2 part juice.    Spend less than two hours per day watching television and other screen media. Screen media includes video games, movies and computer use for entertainment.    How can I help Bren do this?  -Turn off the TV at dinner. Dinner is the best time to hang out with your kids and just talk, learn about their day, and tell them about your day. Your kids have a lot to learn from you and dinner is a great time to share.

## 2023-09-27 ENCOUNTER — PATIENT MESSAGE (OUTPATIENT)
Dept: PRIMARY CARE | Facility: CLINIC | Age: 53
End: 2023-09-27
Payer: COMMERCIAL

## 2023-09-27 DIAGNOSIS — K21.9 GASTROESOPHAGEAL REFLUX DISEASE, UNSPECIFIED WHETHER ESOPHAGITIS PRESENT: ICD-10-CM

## 2023-09-27 DIAGNOSIS — R13.10 DYSPHAGIA, UNSPECIFIED TYPE: ICD-10-CM

## 2023-10-03 ENCOUNTER — OFFICE VISIT (OUTPATIENT)
Dept: PRIMARY CARE | Facility: CLINIC | Age: 53
End: 2023-10-03
Payer: COMMERCIAL

## 2023-10-03 VITALS
SYSTOLIC BLOOD PRESSURE: 122 MMHG | DIASTOLIC BLOOD PRESSURE: 70 MMHG | HEIGHT: 64 IN | WEIGHT: 208 LBS | BODY MASS INDEX: 35.51 KG/M2

## 2023-10-03 DIAGNOSIS — M26.629 TMJPDS (TEMPOROMANDIBULAR JOINT PAIN DYSFUNCTION SYNDROME): ICD-10-CM

## 2023-10-03 DIAGNOSIS — R01.1 CARDIAC MURMUR: ICD-10-CM

## 2023-10-03 DIAGNOSIS — I10 BENIGN ESSENTIAL HYPERTENSION: ICD-10-CM

## 2023-10-03 DIAGNOSIS — I10 HYPERTENSION, UNSPECIFIED TYPE: ICD-10-CM

## 2023-10-03 DIAGNOSIS — E78.5 HYPERLIPIDEMIA WITH TARGET LOW DENSITY LIPOPROTEIN (LDL) CHOLESTEROL LESS THAN 100 MG/DL: ICD-10-CM

## 2023-10-03 DIAGNOSIS — J01.90 ACUTE SINUSITIS, RECURRENCE NOT SPECIFIED, UNSPECIFIED LOCATION: ICD-10-CM

## 2023-10-03 DIAGNOSIS — E03.9 HYPOTHYROIDISM, UNSPECIFIED TYPE: ICD-10-CM

## 2023-10-03 PROCEDURE — 1036F TOBACCO NON-USER: CPT | Performed by: INTERNAL MEDICINE

## 2023-10-03 PROCEDURE — 99214 OFFICE O/P EST MOD 30 MIN: CPT | Performed by: INTERNAL MEDICINE

## 2023-10-03 PROCEDURE — 3074F SYST BP LT 130 MM HG: CPT | Performed by: INTERNAL MEDICINE

## 2023-10-03 PROCEDURE — 3008F BODY MASS INDEX DOCD: CPT | Performed by: INTERNAL MEDICINE

## 2023-10-03 PROCEDURE — 3078F DIAST BP <80 MM HG: CPT | Performed by: INTERNAL MEDICINE

## 2023-10-03 RX ORDER — OLOPATADINE HYDROCHLORIDE AND MOMETASONE FUROATE 25; 665 UG/1; UG/1
25 SPRAY, METERED NASAL 2 TIMES DAILY
Qty: 29 G | Refills: 3 | Status: SHIPPED | OUTPATIENT
Start: 2023-10-03 | End: 2023-10-03

## 2023-10-03 RX ORDER — AMOXICILLIN AND CLAVULANATE POTASSIUM 875; 125 MG/1; MG/1
875 TABLET, FILM COATED ORAL 2 TIMES DAILY
Qty: 20 TABLET | Refills: 0 | Status: SHIPPED | OUTPATIENT
Start: 2023-10-03 | End: 2023-10-13

## 2023-10-03 RX ORDER — NABUMETONE 750 MG/1
750 TABLET, FILM COATED ORAL 2 TIMES DAILY
Qty: 60 TABLET | Refills: 11 | Status: SHIPPED | OUTPATIENT
Start: 2023-10-03 | End: 2023-11-07 | Stop reason: WASHOUT

## 2023-10-03 ASSESSMENT — ENCOUNTER SYMPTOMS
LOSS OF SENSATION IN FEET: 0
OCCASIONAL FEELINGS OF UNSTEADINESS: 0
DEPRESSION: 0

## 2023-10-04 NOTE — PROGRESS NOTES
"Subjective   Patient ID: Bren Johnson is a 53 y.o. female who presents for Follow-up (multiple medical issues) and Sore Throat.    It is always a delight to serve Ms. Johnson.  She came with her  with permission.  1. Sore throat, congestion, lymph nodes enlarged, difficulty in breathing at times.  She is concerned.  Both TMJ joints are hurting though she is already using mouth guard.  2. Her blood pressure is always high, especially diastolic.  She is worried.  She is an educated woman. She is concerned with that.  Dyspnea on exertion, easy fatigability.  3.  noticed that she grinds her teeth, sleep apnea, increased daytime somnolence, tired, fatigued, exhausted, snoring.  She is concerned.  She follows a local doctor there.    I have personally reviewed the patient's Past Medical History, Medications, Allergies, Social History, and Family History in the EMR.    Review of Systems   All other systems reviewed and are negative.    Objective   /70   Ht 1.626 m (5' 4\")   Wt 94.3 kg (208 lb)   BMI 35.70 kg/m²     Physical Exam  Vitals reviewed.   HENT:      Ears:      Comments: Both TMJ joints are very tender.     Nose: Congestion present.      Comments: Postnasal drip.     Mouth/Throat:      Comments: Throat congestion.  Cardiovascular:      Heart sounds: S1 normal and S2 normal. Murmur heard.      Diastolic (at the tricuspid pulmonary area) murmur is present.      No friction rub.      Comments: I could not hear the murmur completely, but is accentuated from the expiration.  Pulmonary:      Effort: Pulmonary effort is normal.      Breath sounds: Normal breath sounds and air entry.   Abdominal:      Palpations: There is no hepatomegaly, splenomegaly or mass.   Musculoskeletal:      Right lower le+ Edema present.      Left lower le+ Edema present.   Lymphadenopathy:      Cervical: Cervical adenopathy present.      Lower Body: No right inguinal adenopathy. No left inguinal adenopathy. "   Neurological:      Cranial Nerves: Cranial nerves 2-12 are intact.      Sensory: No sensory deficit.      Motor: Motor function is intact.      Deep Tendon Reflexes: Reflexes are normal and symmetric.     LAB WORK: Laboratory testing discussed.    Assessment/Plan   Problem List Items Addressed This Visit             ICD-10-CM       Cardiac and Vasculature    Benign essential hypertension I10    Relevant Orders    CBC    Comprehensive Metabolic Panel    Urinalysis with Reflex Microscopic       Endocrine/Metabolic    Hypothyroidism E03.9    Relevant Orders    Thyroid Stimulating Hormone     Other Visit Diagnoses         Codes    Cardiac murmur     R01.1    Relevant Orders    Transthoracic Echo (TTE) Complete    Hypertension, unspecified type     I10    Relevant Orders    Transthoracic Echo (TTE) Complete    Acute sinusitis, recurrence not specified, unspecified location     J01.90    Relevant Medications    amoxicillin-pot clavulanate (Augmentin) 875-125 mg tablet    TMJPDS (temporomandibular joint pain dysfunction syndrome)     M26.629    Relevant Medications    nabumetone (Relafen) 750 mg tablet    Hyperlipidemia with target low density lipoprotein (LDL) cholesterol less than 100 mg/dL     E78.5    Relevant Orders    Lipid Panel        1. Pharyngitis, postnasal drip, lymph node.  Augmentin.  2. TMJ joint pain.  The patient has a [id number] mouthguard, use it.  Relafen, soft food.  3. Allergies, postnasal drip, congestion.  I added Ryaltris inhaler for her.  4. Diastolic blood pressure high consistently and murmur.  I am concerned.  2D echo ordered to see the valvular heart disease.  5. Question of sleep of apnea.  Home sleep test ordered.  She attempted sleep test more than 10 years ago and CPAP did not work out.  Home sleep test immediately ordered.  6. Echo, fasting blood work, and sleep test.  7. I shall see her back in a couple of weeks after test.    Scribe Attestation  By signing my name below, IAlice  Katharina Bergeron attest that this documentation has been prepared under the direction and in the presence of Asher Alexander MD.

## 2023-10-10 ENCOUNTER — OFFICE VISIT (OUTPATIENT)
Dept: OTOLARYNGOLOGY | Facility: CLINIC | Age: 53
End: 2023-10-10
Payer: COMMERCIAL

## 2023-10-10 VITALS — WEIGHT: 198 LBS | BODY MASS INDEX: 33.99 KG/M2

## 2023-10-10 DIAGNOSIS — J30.1 ALLERGIC RHINITIS DUE TO POLLEN, UNSPECIFIED SEASONALITY: ICD-10-CM

## 2023-10-10 DIAGNOSIS — M26.609 TEMPOROMANDIBULAR JOINT DISORDER: Primary | ICD-10-CM

## 2023-10-10 DIAGNOSIS — J01.91 ACUTE RECURRENT SINUSITIS, UNSPECIFIED LOCATION: ICD-10-CM

## 2023-10-10 DIAGNOSIS — K21.9 GASTROESOPHAGEAL REFLUX DISEASE, UNSPECIFIED WHETHER ESOPHAGITIS PRESENT: ICD-10-CM

## 2023-10-10 DIAGNOSIS — I88.9 CERVICAL LYMPHADENITIS: ICD-10-CM

## 2023-10-10 DIAGNOSIS — R06.83 SNORING: ICD-10-CM

## 2023-10-10 PROCEDURE — 1036F TOBACCO NON-USER: CPT | Performed by: GENERAL PRACTICE

## 2023-10-10 PROCEDURE — 3008F BODY MASS INDEX DOCD: CPT | Performed by: GENERAL PRACTICE

## 2023-10-10 PROCEDURE — 99204 OFFICE O/P NEW MOD 45 MIN: CPT | Performed by: GENERAL PRACTICE

## 2023-10-10 ASSESSMENT — PATIENT HEALTH QUESTIONNAIRE - PHQ9
1. LITTLE INTEREST OR PLEASURE IN DOING THINGS: NOT AT ALL
2. FEELING DOWN, DEPRESSED OR HOPELESS: NOT AT ALL
SUM OF ALL RESPONSES TO PHQ9 QUESTIONS 1 AND 2: 0

## 2023-10-10 NOTE — PROGRESS NOTES
Otolaryngology - Head and Neck Surgery Outpatient New Patient Visit Note         History Of Present Illness  Bren Johnson is a 53 y.o. female presenting for evaluation of significant sinus pain, sore throat, neck discomfort, odynophagia and TMJ pain    Reports 2 weeks of sypmtoms.   No inciting illness/trauma    Reports a history of recurrent sinusitis as well as significant allergic rhinitis (typically worse in fall)  Reports use of xyzal, singulair and PRN nasocort.  Reports BID neilmed rinse.   Reports ongoing max/midface/frontal pain/pressure and increased congestion and PND.    Recently started on augmentin by PCM for sinusitis.      Reports a history of TMJ (uses bite block) but increase in symptoms in recent 1-2 weeks, with trismus and chronic b/l ear/neck/jaw pain.    Taking relafen BID for NSAID.     Notes b/l neck TTP and soreness, but denies palpable neck mass, restricted neck movement, dyspnea, dysphonia.     Reports some longstanding glbous, pharyngeal mucous and intermittent choking on swallow.  Reports a history of GERD.  Previously took nexium, recently only on PRN tums.      Reports  a history of snoring and concerns for SHERLYN.                 Past Medical History  She has a past medical history of Adjustment insomnia (2014), Allergic rhinitis, Hypertension, Hypothyroid, Personal history of other specified conditions, and Personal history of other specified conditions.    Surgical History  She has a past surgical history that includes  section, classic (2014).     Social History  She reports that she has never smoked. She has never been exposed to tobacco smoke. She has never used smokeless tobacco. She reports that she does not currently use alcohol. She reports that she does not use drugs.    Family History  Family History   Problem Relation Name Age of Onset    Diabetes Mother      Diabetes Father          Allergies  Lisinopril    Review of Systems  ROS: Pertinent positives as  noted in HPI.    - CONSTITUTIONAL: Does not report weight loss, fever or chills.    - HEENT:   Ear: Does not report tinnitus, vertigo, hearing loss, otalgia, otorrhea  Nose: Does not report  ,  , epistaxis, decreased smell  Throat: Does not report  , dysphagia,    Larynx: Does not report  ,     , pain with speaking (odynophonia)  Neck: Does not report new masses,  , swelling  Face: Does not report    ,  , swelling, numbness, weakness     - RESPIRATORY: Does not report SOB or cough.    - CV: Does not report palpitations or chest pain.     - GI: Does not report abdominal pain, nausea, vomiting or diarrhea.    - : Does not report dysuria or urinary frequency.    - MSK: Does not report myalgia or joint pain.    - SKIN: Does not report rash or pruritus.    - NEUROLOGICAL: Does not report headache or syncope.    - PSYCHIATRIC: Does not report recent changes in mood. Does not report anxiety or depression.         Physical Exam:     GENERAL:   Alert & Oriented to person, place and time; Normal affect and appearance. Well developed and well nourished. Conversant & cooperative with examination.     HEAD:   Normocephalic, atraumatic. No sinus tenderness to palpation. Normal parotid bilaterally. Normal facial strength.   TTP at TMJ  b/l with moderate trismus.      NEUROLOGIC:   Cranial nerves II-XII grossly intact, gait WNL. Normal mood and affect.    EYES:   Extraocular movements intact. Pupils equal, round, reactive to light and accommodation. No nystagmus, no ptosis. no scleral injection.    EAR:   Normal auricle. No discomfort or TTP with manipulation.   Handheld otoscopic exam showed normal external auditory canals bilaterally. No purulence or EAC inflammation. Minimal cerumen.   Right tympanic membrane clear and mobile without evidence of perforation, retraction or middle ear effusion.   Left tympanic membrane clear and mobile without evidence of perforation, retraction or middle ear effusion.     NOSE:   No external  deformity. No external nasal lesions, lacerations, or scars. Nasal tip symmetrical with normal nasal valves.   Nasal cavity with essentially midline septum, edematous  mucosa and turbinates. No lesions, masses, purulence or polyps.     OC/OP:   Mucous membranes moist, no masses, lesions or exudates.   Normal tongue, floor of mouth, teeth, gums, lips. Normal posterior pharyngeal wall.    Normal 1+ tonsils without erythema, exudate or obvious calculi   Cobblestoning of posterior pharyngeal wall    NECK:   No neck masses or thyroid enlargement. Trachea midline.   tenderness to palpation in level III b/l.        RESPIRATORY:   Symmetric chest elevation & no retractions. No significant hoarseness. No increased work of breathing.    CV:   No clubbing or cyanosis. No obvious edema    Skin:   No facial rashes, vesicles or lesions.     Extremities:   No gross abnormalities      Clinic Procedure        Information review:  External sources (notes, imaging, lab results) listed below personally reviewed to aid in medical decision making process.  - PCM note Dr. Alexander 10/3/23  - PCM note Dr. Corey 9/26/23  -PCM note Dr. Corey 3/16/23      Assessment/Plan   Problem List Items Addressed This Visit             ICD-10-CM       ENT    Allergic rhinitis J30.9    Acute recurrent sinusitis J01.91    Relevant Orders    CT sinus w IV contrast    Temporomandibular joint disorder - Primary M26.609       Infectious Diseases    Cervical lymphadenitis I88.9       Sleep    Snoring R06.83     53yoF with sinusitis contributing to sore throat and reactive lymphadenitis.  Also with GERD contributing to throat discomfort.  Also with poorly controlld TMJ symptoms.     Discussed addition of PO steroid to antibiotics currently taking and will acquire CT neck to evaluate for ongoing sinusitis and rule out neck pathology beyond reactive lymph nodes.     Discussed control for GERD with PPI  Discussed TMJ management per PCM/dental instructions.    Discussed soft diet.  Consider flexaril if limited response.          Follow up:  -plan for follow up in clinic after completion of ordered studies    All of the above findings, impressions, treatment planning and follow up plans were discussed with the patient who indicated understanding.  the patient was instructed to contact or return to clinic sooner if symptoms/signs persist or worsen despite the above management.      Jesús Salazar MD  Otolaryngology - Head and Neck Surgery

## 2023-10-11 ENCOUNTER — TELEPHONE (OUTPATIENT)
Dept: OTOLARYNGOLOGY | Facility: CLINIC | Age: 53
End: 2023-10-11
Payer: COMMERCIAL

## 2023-10-11 DIAGNOSIS — K21.9 GASTROESOPHAGEAL REFLUX DISEASE, UNSPECIFIED WHETHER ESOPHAGITIS PRESENT: ICD-10-CM

## 2023-10-11 DIAGNOSIS — J01.91 ACUTE RECURRENT SINUSITIS, UNSPECIFIED LOCATION: Primary | ICD-10-CM

## 2023-10-11 RX ORDER — OMEPRAZOLE 20 MG/1
20 CAPSULE, DELAYED RELEASE ORAL
Qty: 60 CAPSULE | Refills: 11 | Status: SHIPPED | OUTPATIENT
Start: 2023-10-11 | End: 2023-11-07 | Stop reason: SDUPTHER

## 2023-10-11 RX ORDER — METHYLPREDNISOLONE 4 MG/1
TABLET ORAL
Qty: 21 TABLET | Refills: 0 | Status: SHIPPED | OUTPATIENT
Start: 2023-10-11 | End: 2023-10-18

## 2023-10-16 RX ORDER — PREDNISONE 10 MG/1
TABLET ORAL
Qty: 30 TABLET | Refills: 0 | Status: SHIPPED | OUTPATIENT
Start: 2023-10-16 | End: 2023-10-20

## 2023-10-16 RX ORDER — GUAIFENESIN 600 MG/1
1200 TABLET, EXTENDED RELEASE ORAL 2 TIMES DAILY
Qty: 120 TABLET | Refills: 2 | Status: SHIPPED | OUTPATIENT
Start: 2023-10-16 | End: 2023-11-07 | Stop reason: ALTCHOICE

## 2023-10-16 RX ORDER — OMEPRAZOLE 20 MG/1
20 CAPSULE, DELAYED RELEASE ORAL 2 TIMES DAILY
Qty: 60 CAPSULE | Refills: 2 | Status: SHIPPED | OUTPATIENT
Start: 2023-10-16 | End: 2024-02-08

## 2023-10-23 DIAGNOSIS — R01.1 CARDIAC MURMUR: ICD-10-CM

## 2023-10-24 ENCOUNTER — HOSPITAL ENCOUNTER (OUTPATIENT)
Dept: CARDIOLOGY | Facility: HOSPITAL | Age: 53
Discharge: HOME | End: 2023-10-24
Payer: COMMERCIAL

## 2023-10-24 DIAGNOSIS — R01.1 CARDIAC MURMUR: ICD-10-CM

## 2023-10-24 LAB — EJECTION FRACTION APICAL 4 CHAMBER: 63.6

## 2023-10-24 PROCEDURE — 93306 TTE W/DOPPLER COMPLETE: CPT

## 2023-10-24 PROCEDURE — 93306 TTE W/DOPPLER COMPLETE: CPT | Performed by: STUDENT IN AN ORGANIZED HEALTH CARE EDUCATION/TRAINING PROGRAM

## 2023-10-25 DIAGNOSIS — I88.9 CERVICAL LYMPHADENITIS: Primary | ICD-10-CM

## 2023-10-27 ENCOUNTER — LAB (OUTPATIENT)
Dept: LAB | Facility: LAB | Age: 53
End: 2023-10-27
Payer: COMMERCIAL

## 2023-10-27 DIAGNOSIS — R09.02 HYPOXEMIA: ICD-10-CM

## 2023-10-27 DIAGNOSIS — I10 BENIGN ESSENTIAL HYPERTENSION: ICD-10-CM

## 2023-10-27 DIAGNOSIS — G47.33 OSA (OBSTRUCTIVE SLEEP APNEA): ICD-10-CM

## 2023-10-27 DIAGNOSIS — E78.5 HYPERLIPIDEMIA WITH TARGET LOW DENSITY LIPOPROTEIN (LDL) CHOLESTEROL LESS THAN 100 MG/DL: ICD-10-CM

## 2023-10-27 DIAGNOSIS — E03.9 HYPOTHYROIDISM, UNSPECIFIED TYPE: ICD-10-CM

## 2023-10-27 LAB
ALBUMIN SERPL BCP-MCNC: 4.2 G/DL (ref 3.4–5)
ALP SERPL-CCNC: 68 U/L (ref 33–110)
ALT SERPL W P-5'-P-CCNC: 24 U/L (ref 7–45)
ANION GAP SERPL CALC-SCNC: 10 MMOL/L (ref 10–20)
APPEARANCE UR: CLEAR
AST SERPL W P-5'-P-CCNC: 17 U/L (ref 9–39)
BILIRUB SERPL-MCNC: 0.7 MG/DL (ref 0–1.2)
BILIRUB UR STRIP.AUTO-MCNC: NEGATIVE MG/DL
BUN SERPL-MCNC: 13 MG/DL (ref 6–23)
CALCIUM SERPL-MCNC: 9.1 MG/DL (ref 8.6–10.3)
CHLORIDE SERPL-SCNC: 102 MMOL/L (ref 98–107)
CHOLEST SERPL-MCNC: 244 MG/DL (ref 0–199)
CHOLESTEROL/HDL RATIO: 4.2
CO2 SERPL-SCNC: 31 MMOL/L (ref 21–32)
COLOR UR: YELLOW
CREAT SERPL-MCNC: 0.59 MG/DL (ref 0.5–1.05)
ERYTHROCYTE [DISTWIDTH] IN BLOOD BY AUTOMATED COUNT: 13.3 % (ref 11.5–14.5)
GFR SERPL CREATININE-BSD FRML MDRD: >90 ML/MIN/1.73M*2
GLUCOSE SERPL-MCNC: 104 MG/DL (ref 74–99)
GLUCOSE UR STRIP.AUTO-MCNC: NEGATIVE MG/DL
HCT VFR BLD AUTO: 43.6 % (ref 36–46)
HDLC SERPL-MCNC: 58 MG/DL
HGB BLD-MCNC: 13.4 G/DL (ref 12–16)
KETONES UR STRIP.AUTO-MCNC: NEGATIVE MG/DL
LDLC SERPL CALC-MCNC: 155 MG/DL
LEUKOCYTE ESTERASE UR QL STRIP.AUTO: NEGATIVE
MCH RBC QN AUTO: 26.9 PG (ref 26–34)
MCHC RBC AUTO-ENTMCNC: 30.7 G/DL (ref 32–36)
MCV RBC AUTO: 88 FL (ref 80–100)
NITRITE UR QL STRIP.AUTO: NEGATIVE
NON HDL CHOLESTEROL: 186 MG/DL (ref 0–149)
NRBC BLD-RTO: 0 /100 WBCS (ref 0–0)
PH UR STRIP.AUTO: 7 [PH]
PLATELET # BLD AUTO: 249 X10*3/UL (ref 150–450)
PMV BLD AUTO: 13.2 FL (ref 7.5–11.5)
POTASSIUM SERPL-SCNC: 3.8 MMOL/L (ref 3.5–5.3)
PROT SERPL-MCNC: 6.8 G/DL (ref 6.4–8.2)
PROT UR STRIP.AUTO-MCNC: NEGATIVE MG/DL
RBC # BLD AUTO: 4.98 X10*6/UL (ref 4–5.2)
RBC # UR STRIP.AUTO: NEGATIVE /UL
SODIUM SERPL-SCNC: 139 MMOL/L (ref 136–145)
SP GR UR STRIP.AUTO: 1.01
TRIGL SERPL-MCNC: 153 MG/DL (ref 0–149)
TSH SERPL-ACNC: 1.49 MIU/L (ref 0.44–3.98)
UROBILINOGEN UR STRIP.AUTO-MCNC: <2 MG/DL
VLDL: 31 MG/DL (ref 0–40)
WBC # BLD AUTO: 8.1 X10*3/UL (ref 4.4–11.3)

## 2023-10-27 PROCEDURE — 84443 ASSAY THYROID STIM HORMONE: CPT

## 2023-10-27 PROCEDURE — 81003 URINALYSIS AUTO W/O SCOPE: CPT

## 2023-10-27 PROCEDURE — 80053 COMPREHEN METABOLIC PANEL: CPT

## 2023-10-27 PROCEDURE — 80061 LIPID PANEL: CPT

## 2023-10-27 PROCEDURE — 85027 COMPLETE CBC AUTOMATED: CPT

## 2023-10-27 PROCEDURE — 36415 COLL VENOUS BLD VENIPUNCTURE: CPT

## 2023-11-01 ENCOUNTER — ANCILLARY PROCEDURE (OUTPATIENT)
Dept: RADIOLOGY | Facility: CLINIC | Age: 53
End: 2023-11-01
Payer: COMMERCIAL

## 2023-11-01 ENCOUNTER — APPOINTMENT (OUTPATIENT)
Dept: RADIOLOGY | Facility: CLINIC | Age: 53
End: 2023-11-01
Payer: COMMERCIAL

## 2023-11-01 DIAGNOSIS — I88.9 CERVICAL LYMPHADENITIS: ICD-10-CM

## 2023-11-01 PROCEDURE — 70491 CT SOFT TISSUE NECK W/DYE: CPT

## 2023-11-01 PROCEDURE — 2550000001 HC RX 255 CONTRASTS: Performed by: GENERAL PRACTICE

## 2023-11-01 PROCEDURE — 70491 CT SOFT TISSUE NECK W/DYE: CPT | Performed by: RADIOLOGY

## 2023-11-01 RX ADMIN — IOHEXOL 68 ML: 350 INJECTION, SOLUTION INTRAVENOUS at 15:18

## 2023-11-07 ENCOUNTER — OFFICE VISIT (OUTPATIENT)
Dept: CARDIOLOGY | Facility: CLINIC | Age: 53
End: 2023-11-07
Payer: COMMERCIAL

## 2023-11-07 VITALS
BODY MASS INDEX: 33.72 KG/M2 | HEART RATE: 79 BPM | SYSTOLIC BLOOD PRESSURE: 138 MMHG | HEIGHT: 65 IN | WEIGHT: 202.4 LBS | OXYGEN SATURATION: 94 % | DIASTOLIC BLOOD PRESSURE: 87 MMHG

## 2023-11-07 DIAGNOSIS — I10 BENIGN ESSENTIAL HYPERTENSION: Primary | ICD-10-CM

## 2023-11-07 DIAGNOSIS — M79.602 LEFT ARM PAIN: ICD-10-CM

## 2023-11-07 PROCEDURE — 93010 ELECTROCARDIOGRAM REPORT: CPT | Performed by: INTERNAL MEDICINE

## 2023-11-07 PROCEDURE — 99214 OFFICE O/P EST MOD 30 MIN: CPT | Mod: 25 | Performed by: INTERNAL MEDICINE

## 2023-11-07 PROCEDURE — 1036F TOBACCO NON-USER: CPT | Performed by: INTERNAL MEDICINE

## 2023-11-07 PROCEDURE — 3075F SYST BP GE 130 - 139MM HG: CPT | Performed by: INTERNAL MEDICINE

## 2023-11-07 PROCEDURE — 3079F DIAST BP 80-89 MM HG: CPT | Performed by: INTERNAL MEDICINE

## 2023-11-07 PROCEDURE — 99204 OFFICE O/P NEW MOD 45 MIN: CPT | Performed by: INTERNAL MEDICINE

## 2023-11-07 PROCEDURE — 93005 ELECTROCARDIOGRAM TRACING: CPT | Performed by: INTERNAL MEDICINE

## 2023-11-07 PROCEDURE — 3008F BODY MASS INDEX DOCD: CPT | Performed by: INTERNAL MEDICINE

## 2023-11-07 ASSESSMENT — COLUMBIA-SUICIDE SEVERITY RATING SCALE - C-SSRS
2. HAVE YOU ACTUALLY HAD ANY THOUGHTS OF KILLING YOURSELF?: NO
6. HAVE YOU EVER DONE ANYTHING, STARTED TO DO ANYTHING, OR PREPARED TO DO ANYTHING TO END YOUR LIFE?: NO
1. IN THE PAST MONTH, HAVE YOU WISHED YOU WERE DEAD OR WISHED YOU COULD GO TO SLEEP AND NOT WAKE UP?: NO

## 2023-11-07 ASSESSMENT — ENCOUNTER SYMPTOMS
COUGH: 0
CHILLS: 0
HEADACHES: 0
MEMORY LOSS: 0
HEMATURIA: 0
DEPRESSION: 0
VOMITING: 0
ABDOMINAL PAIN: 0
OCCASIONAL FEELINGS OF UNSTEADINESS: 0
LOSS OF SENSATION IN FEET: 0
CONSTIPATION: 0
MYALGIAS: 0
ALTERED MENTAL STATUS: 0
WHEEZING: 0
FEVER: 0
DEPRESSION: 1
DYSURIA: 0
NAUSEA: 0
FALLS: 0
BLOATING: 0
HEMOPTYSIS: 0
DIARRHEA: 0

## 2023-11-07 ASSESSMENT — PAIN SCALES - GENERAL: PAINLEVEL: 0-NO PAIN

## 2023-11-07 ASSESSMENT — PATIENT HEALTH QUESTIONNAIRE - PHQ9
2. FEELING DOWN, DEPRESSED OR HOPELESS: NOT AT ALL
1. LITTLE INTEREST OR PLEASURE IN DOING THINGS: NOT AT ALL
SUM OF ALL RESPONSES TO PHQ9 QUESTIONS 1 AND 2: 0

## 2023-11-07 NOTE — PROGRESS NOTES
Chief complaint:  New Patient Visit     HPI  52 yo F w/ h/o HTN, HLD, hypothyroid, GERD, SHERLYN (CPAP) now here for cardiology consult. She just started CPAP a few days ago. Her main issue is labile BP (occ 80s/50s with LH, occ 140s/100s with HA).   Over the past couple of weeks, she is having occ LUE pain at rest x minutes w/ assoc LUE numb.  No chest pain. No dyspnea at rest. +occ GRUBBS. No orthopnea/PND. +rare vague palps. No LH/dizzy/syncope. +occ min LE edema. No claudication. No cough. +occ fatigue.  BP at home: 80s-140s/50s-100s  ECG 11/23: SR (74), normal  Echo 10/23: EF 65-70%, DD, mild TR  SS 10/23: sev SHERLYN    Review of Systems   Constitutional: Negative for chills, fever and malaise/fatigue.   HENT:  Negative for hearing loss.    Eyes:  Negative for visual disturbance.   Respiratory:  Negative for cough, hemoptysis and wheezing.    Skin:  Negative for rash.   Musculoskeletal:  Negative for falls and myalgias.   Gastrointestinal:  Negative for bloating, abdominal pain, constipation, diarrhea, dysphagia, nausea and vomiting.   Genitourinary:  Negative for dysuria and hematuria.   Neurological:  Negative for headaches.   Psychiatric/Behavioral:  Negative for altered mental status, depression and memory loss.         Social History     Tobacco Use    Smoking status: Never     Passive exposure: Never    Smokeless tobacco: Never   Substance Use Topics    Alcohol use: Not Currently        Family History   Problem Relation Name Age of Onset    Diabetes Mother      Diabetes Father          Allergies   Allergen Reactions    Lisinopril Other        Current Outpatient Medications   Medication Instructions    budesonide-formoteroL (Symbicort) 80-4.5 mcg/actuation inhaler 2 puffs, inhalation, 2 times daily RT    cholecalciferol (VITAMIN D-3) 5,000 Units, oral, Daily    hydroCHLOROthiazide (HYDRODIURIL) 12.5 mg, oral, Daily    levocetirizine (XYZAL) 5 mg, oral, Every evening    levothyroxine (SYNTHROID, LEVOXYL) 75 mcg,  oral, Daily before breakfast    montelukast (SINGULAIR) 10 mg, oral, Nightly    olopatadine-mometasone (Ryaltris) 665-25 mcg/spray spray,non-aerosol 25 mcg, nasal, 2 times daily    omeprazole (PRILOSEC) 20 mg, oral, 2 times daily, Do not crush or chew.    verapamil SR (CALAN-SR) 360 mg, oral, Nightly        Vitals:    11/07/23 1536   BP: (!) 144/106   Pulse: 79   SpO2: 94%      Vitals:    11/07/23 1605   BP: 138/87   Pulse:    SpO2:         Physical Exam  Constitutional:       Appearance: Normal appearance.   HENT:      Head: Normocephalic and atraumatic.      Nose: Nose normal.   Cardiovascular:      Rate and Rhythm: Normal rate and regular rhythm.      Heart sounds: Murmur heard.      Systolic murmur is present with a grade of 1/6.   Pulmonary:      Effort: Pulmonary effort is normal.      Breath sounds: Normal breath sounds.   Abdominal:      Palpations: Abdomen is soft.      Tenderness: There is no abdominal tenderness.   Musculoskeletal:      Right lower leg: No edema.      Left lower leg: No edema.   Skin:     General: Skin is warm and dry.   Neurological:      General: No focal deficit present.      Mental Status: She is alert.   Psychiatric:         Mood and Affect: Mood normal.         Judgment: Judgment normal.          Lab Results   Component Value Date    CR 0.59      HGB 13.4      K 3.8      MG      BNP No results found for requested labs within last 365 days.            LDL      TSH       Assessment/Plan   54 yo F w/ h/o HTN, HLD, hypothyroid, GERD, SHERLYN (CPAP) now w/ occ labile BP of unclear etiology. She just started CPAP which should reduce BP. If more lows, can cut back on Verapamil. If more highs, can add hydrochlorothiazide. She prefers to continue Verapamil and not change to other med. Recommend log home BPs and bring to appoinemtns. LUE pain/numb most c/w pinched nerve. However, check CT cardiac score. If elevated, consider stress test. F/U PRN    Devin Maza MD

## 2023-11-14 ENCOUNTER — OFFICE VISIT (OUTPATIENT)
Dept: OTOLARYNGOLOGY | Facility: CLINIC | Age: 53
End: 2023-11-14
Payer: COMMERCIAL

## 2023-11-14 VITALS — BODY MASS INDEX: 33.46 KG/M2 | WEIGHT: 198 LBS

## 2023-11-14 DIAGNOSIS — G47.33 OSA ON CPAP: ICD-10-CM

## 2023-11-14 DIAGNOSIS — K21.9 GASTROESOPHAGEAL REFLUX DISEASE, UNSPECIFIED WHETHER ESOPHAGITIS PRESENT: ICD-10-CM

## 2023-11-14 DIAGNOSIS — J34.2 DEVIATED SEPTUM: Primary | ICD-10-CM

## 2023-11-14 DIAGNOSIS — J34.3 HYPERTROPHY OF NASAL TURBINATES: ICD-10-CM

## 2023-11-14 PROCEDURE — 1036F TOBACCO NON-USER: CPT | Performed by: GENERAL PRACTICE

## 2023-11-14 PROCEDURE — 31575 DIAGNOSTIC LARYNGOSCOPY: CPT | Performed by: GENERAL PRACTICE

## 2023-11-14 PROCEDURE — 3079F DIAST BP 80-89 MM HG: CPT | Performed by: GENERAL PRACTICE

## 2023-11-14 PROCEDURE — 3008F BODY MASS INDEX DOCD: CPT | Performed by: GENERAL PRACTICE

## 2023-11-14 PROCEDURE — 3075F SYST BP GE 130 - 139MM HG: CPT | Performed by: GENERAL PRACTICE

## 2023-11-14 PROCEDURE — 99214 OFFICE O/P EST MOD 30 MIN: CPT | Performed by: GENERAL PRACTICE

## 2023-11-14 RX ORDER — FAMOTIDINE 20 MG/1
20 TABLET, FILM COATED ORAL NIGHTLY
Qty: 60 TABLET | Refills: 2 | Status: SHIPPED | OUTPATIENT
Start: 2023-11-14 | End: 2024-11-13

## 2023-11-14 ASSESSMENT — PATIENT HEALTH QUESTIONNAIRE - PHQ9
2. FEELING DOWN, DEPRESSED OR HOPELESS: NOT AT ALL
SUM OF ALL RESPONSES TO PHQ9 QUESTIONS 1 AND 2: 0
1. LITTLE INTEREST OR PLEASURE IN DOING THINGS: NOT AT ALL

## 2023-11-14 NOTE — PROGRESS NOTES
Otolaryngology - Head and Neck Surgery Outpatient New Patient Visit Note        Assessment/Plan   Problem List Items Addressed This Visit    None  Visit Diagnoses         Codes    Deviated septum    -  Primary J34.2    Gastroesophageal reflux disease, unspecified whether esophagitis present     K21.9    Relevant Orders    Referral to Gastroenterology    Hypertrophy of nasal turbinates     J34.3    SHERLYN on CPAP     G47.33          Much improve from last exam but still with bothersome globus and pharyngeal mucous.    Significant posterior retropharyngeal inflammation on NP consistent with reflux, will add H2 blocker and have pt see GI for long term management.     Chronic nasal obstruction without evidence of ongoing sinusitis on recent CT.  Deviated septum, turbinate hypertrophy and mellissa bullosa contributing ot nasal obstruction.   Inconsistent use of ryaltris, discussed consistent use.   Tolerating CPAP well  Discussed possible septoturbinoplasty and possible mellissa reduction for chronic nasal obstruction and CPAP tolerance.     Follow up in 1-2mo to reassess      Consider muscle tension dysphonia and speech therapy referral.      Follow up:  -plan for follow up in clinic as needed and in 1-2 months    All of the above findings, impressions, treatment planning and follow up plans were discussed with the patient who indicated understanding.  the patient was instructed to contact or return to clinic sooner if symptoms/signs persist or worsen despite the above management.      Jesús Salazar MD  Otolaryngology - Head and Neck Surgery            History Of Present Illness  Bren Johnson is a 53 y.o. female presenting for follow up for treatment of cervical lymphadenitis, concerns for sinusitis and reflux.     Reports symptoms still much improved though still with some globus and mild throat irritation.   Notes some ongiong vocal fatigue and throat tension with prolonged speech.     Reports ongoing nasal  obstruction/congetion.  Recently started on ryaltris with some effect when using, but inconsistent use.     Reports improvement in reflux and throat symptoms with use of omeprazole BID, but still with nighttime symptoms.  Has been making dietary/behavioral changes to aid.     Reports starting on CPAP for severe SHERLYN, and tolerating it well.                 Past Medical History  She has a past medical history of Adjustment insomnia (2014), Allergic rhinitis, Hypertension, Hypothyroid, Personal history of other specified conditions, and Personal history of other specified conditions.    Surgical History  She has a past surgical history that includes  section, classic (2014).     Social History  She reports that she has never smoked. She has never been exposed to tobacco smoke. She has never used smokeless tobacco. She reports that she does not currently use alcohol. She reports that she does not use drugs.    Family History  Family History   Problem Relation Name Age of Onset    Diabetes Mother      Diabetes Father          Allergies  Lisinopril    Review of Systems  ROS: Pertinent positives as noted in HPI.    - CONSTITUTIONAL: Does not report weight loss, fever or chills.    - HEENT:   Ear: Does not report tinnitus, vertigo, hearing loss, otalgia, otorrhea  Nose: Does not report  ,  , epistaxis, decreased smell  Throat: Does not report  , dysphagia, odynophagia  Larynx: Does not report  ,  difficulty breathing, pain with speaking (odynophonia)  Neck: Does not report new masses, pain, swelling  Face: Does not report sinus pain, pressure, swelling, numbness, weakness     - RESPIRATORY: Does not report SOB or cough.    - CV: Does not report palpitations or chest pain.     - GI: Does not report abdominal pain, nausea, vomiting or diarrhea.    - : Does not report dysuria or urinary frequency.    - MSK: Does not report myalgia or joint pain.    - SKIN: Does not report rash or pruritus.    -  NEUROLOGICAL: Does not report headache or syncope.    - PSYCHIATRIC: Does not report recent changes in mood. Does not report anxiety or depression.         Physical Exam:     GENERAL:   Alert & Oriented to person, place and time; Normal affect and appearance. Well developed and well nourished. Conversant & cooperative with examination.     HEAD:   Normocephalic, atraumatic. No sinus tenderness to palpation. Normal parotid bilaterally. Normal facial strength.     NEUROLOGIC:   Cranial nerves II-XII grossly intact, gait WNL. Normal mood and affect.    EYES:   Extraocular movements intact. Pupils equal, round, reactive to light and accommodation. No nystagmus, no ptosis. no scleral injection.    EAR:   Normal auricle. No discomfort or TTP with manipulation.   Handheld otoscopic exam showed normal external auditory canals bilaterally. No purulence or EAC inflammation. Minimal cerumen.   Right tympanic membrane clear and mobile without evidence of perforation, retraction or middle ear effusion.   Left tympanic membrane clear and mobile without evidence of perforation, retraction or middle ear effusion.     NOSE:   No external deformity. No external nasal lesions, lacerations, or scars. Nasal tip symmetrical with normal nasal valves.   Nasal cavity with  high rightward septum, edematous  mucosa and hypertrophic turbinates. No lesions, masses, purulence or polyps.     OC/OP:   Mucous membranes moist, no masses, lesions or exudates.   Normal tongue, floor of mouth, teeth, gums, lips. Normal posterior pharyngeal wall.    Normal tonsils without erythema, exudate or obvious calculi     NECK:   No neck masses or thyroid enlargement. Trachea midline. No tenderness to palpation    LYMPHATIC:   No cervical lymphadenopathy.     RESPIRATORY:   Symmetric chest elevation & no retractions. No significant hoarseness. No increased work of breathing.    CV:   No clubbing or cyanosis. No obvious edema    Skin:   No facial rashes, vesicles  or lesions.     Extremities:   No gross abnormalities      Clinic Procedure    Nasal Endoscopy Laryngoscopy Nasophrayngosocopy   Procedure: flexible fiberoptic laryngoscopy, nasopharyngoscopy.   Flexible Fiberoptic Laryngoscopy Indication:   inability to tolerate mirror exam     Risks, benefits, and alternatives, infection risk, bleeding risk and risk of mucosal trauma and pain were discussed with the patient. Verbal consent was obtained prior to the procedure and is detailed in the patient's record.     Procedure Note:      With the patient seated in the exam chair, the bilateral nasal cavity was topically treated with 4% lidocaine and phenylephrine.  The bilateral nasal cavity was intubated with the flexible laryngoscope.   Nasal Endoscopy: Nasal endoscopy was successfully completed using the endoscope and the nasal mucosa, septum, turbinates, and osteomeatal complex were examined.  Nasopharyngoscopy: Nasopharyngoscopy was successfully completed using the endoscope and the nasal mucosa, septum, turbinates, osteomeatal complex, and nasopharynx were examined.   Laryngoscopy: Laryngoscopy was successfully completed using the flexible laryngoscope and the nasopharynx, hypopharynx, and larynx were examined.  Patient Status: the patient tolerated the procedure well.   Complications: there were no complications.      . After obtaining adequate decongestion and anesthesia, the scope was introduced into the floor of the nasal cavity. The septum, inferior, and middle turbinates were without any mucosal lesions.  The Fossa of [place] were clear bilaterally, and good velopharyngeal closure was obtained on phonation.  Base of tongue, tonsillar complex, and posterior pharyngeal wall free of asymmetry or concerning ulcerations or masses.  supraglottic segments with edema, pachydermia and erythema at the esophageal opening and posterior supraglottis.  scattered mucous debris.  No mucosal ulcerations or masses.  True vocal  cords abduct and adduct normally with no mucosal or mass lesions.  No masses visualized in the pyriform recesses.  The scope was removed and patient tolerated the procedure well.      Information review:  External sources (notes, imaging, lab results) listed below personally reviewed to aid in medical decision making process.  -  CT neck shows no concerning lymphadenopathy.  High rightward deviated septum, b/l mellissa bullosa, turbinate hypertrophy.  Mild MRC in right maxillary sinus.    -  -

## 2023-11-15 ENCOUNTER — TELEMEDICINE (OUTPATIENT)
Dept: PRIMARY CARE | Facility: CLINIC | Age: 53
End: 2023-11-15
Payer: COMMERCIAL

## 2023-11-15 DIAGNOSIS — K21.9 GASTROESOPHAGEAL REFLUX DISEASE WITHOUT ESOPHAGITIS: ICD-10-CM

## 2023-11-15 DIAGNOSIS — J32.9 RECURRENT SINUSITIS: Primary | ICD-10-CM

## 2023-11-15 DIAGNOSIS — G47.30 SLEEP APNEA, UNSPECIFIED TYPE: ICD-10-CM

## 2023-11-15 PROCEDURE — 99213 OFFICE O/P EST LOW 20 MIN: CPT | Performed by: INTERNAL MEDICINE

## 2023-11-15 ASSESSMENT — ENCOUNTER SYMPTOMS
STRIDOR: 0
VOMITING: 0
NECK PAIN: 0
SHORTNESS OF BREATH: 1
SORE THROAT: 1
TROUBLE SWALLOWING: 1
ABDOMINAL PAIN: 0
HOARSE VOICE: 1
HEADACHES: 1
COUGH: 1
SWOLLEN GLANDS: 1

## 2023-11-16 PROBLEM — G47.30 SLEEP APNEA: Status: ACTIVE | Noted: 2023-11-16

## 2023-11-16 PROBLEM — K21.9 GASTROESOPHAGEAL REFLUX DISEASE WITHOUT ESOPHAGITIS: Status: ACTIVE | Noted: 2023-11-16

## 2023-11-16 LAB
ATRIAL RATE: 74 BPM
P AXIS: 54 DEGREES
P OFFSET: 168 MS
P ONSET: 117 MS
PR INTERVAL: 204 MS
Q ONSET: 219 MS
QRS COUNT: 12 BEATS
QRS DURATION: 84 MS
QT INTERVAL: 418 MS
QTC CALCULATION(BAZETT): 463 MS
QTC FREDERICIA: 448 MS
R AXIS: 5 DEGREES
T AXIS: 15 DEGREES
T OFFSET: 428 MS
VENTRICULAR RATE: 74 BPM

## 2023-11-16 ASSESSMENT — ENCOUNTER SYMPTOMS
NECK PAIN: 0
SHORTNESS OF BREATH: 1
ABDOMINAL PAIN: 0
SWOLLEN GLANDS: 1
HOARSE VOICE: 1
SORE THROAT: 1
COUGH: 1
STRIDOR: 0
TROUBLE SWALLOWING: 1
HEADACHES: 1
VOMITING: 0

## 2023-11-16 NOTE — PROGRESS NOTES
Subjective   Patient ID: Bren Johnson is a 53 y.o. female who presents for Virtual visit and Follow-up (Various conditions).    Ms. Bren Johnson today came here for virtual visit.  She saw ENT surgeon.  He is worried about her too much acid reflux symptoms causing the problem, recurrent sinusitis.  He thinks that deviated nasal septum is causing the problem.  She came for follow-up on various conditions.  Appetite and weight are okay.  No problem.    I have personally reviewed the patient's Past Medical History, Medications, Allergies, Social History, and Family History in the EMR.    Sore Throat   This is a recurrent problem. The current episode started in the past 7 days. The problem has been waxing and waning. Neither side of throat is experiencing more pain than the other. There has been no fever. The pain is at a severity of 9/10. Associated symptoms include congestion, coughing, headaches, a hoarse voice, shortness of breath, swollen glands and trouble swallowing. Pertinent negatives include no abdominal pain, drooling, ear discharge, ear pain, plugged ear sensation, neck pain, stridor or vomiting. She has had no exposure to strep or mono.     Review of Systems   HENT:  Positive for congestion, hoarse voice, sore throat and trouble swallowing. Negative for drooling, ear discharge and ear pain.    Respiratory:  Positive for cough and shortness of breath. Negative for stridor.    Gastrointestinal:  Negative for abdominal pain and vomiting.   Musculoskeletal:  Negative for neck pain.   Neurological:  Positive for headaches.   All other systems reviewed and are negative.    Objective   Virtual exam.  There were no vitals taken for this visit.    Physical Exam  HENT:      Nose: Congestion present.      Comments: Postnasal drip on camera.     Mouth/Throat:      Comments: Throat congested.  Musculoskeletal:      Right lower leg: No edema.      Left lower leg: No edema.     LAB WORK: Laboratory testing  discussed.    Assessment/Plan   Problem List Items Addressed This Visit    None  Visit Diagnoses         Codes    Sleep apnea, unspecified type    -  Primary G47.30    Gastroesophageal reflux disease without esophagitis     K21.9    Relevant Orders    Referral to Gastroenterology    Recurrent sinusitis     J32.9    Relevant Orders    CT sinus wo IV contrast        1. Recurrent sinusitis, postnasal drip, congestion.  CT sinuses ordered.  2. Acid reflux symptoms, two agents Prilosec, Pepcid not working.  I added EGD with GI.  3. Sleep apnea.  Her machine is working.  She will benefit from night oxygen.  She has a symptom for hypoxia.  Night oxygen added for adjusting setup.  4. Follow-up with me in two to three week’s time.  5. Time spent almost 38 minutes, more than half in direct patient care done through the iPhone.    Scribe Attestation  By signing my name below, IHalley, Scribe attest that this documentation has been prepared under the direction and in the presence of Asher Alexander MD.

## 2023-11-16 NOTE — PROGRESS NOTES
Subjective   Patient ID: Bren Johnson is a 53 y.o. female who presents for Virtual visit and Follow-up (Various conditions).    Ms. Bren Johnson today came here for virtual visit.  She saw ENT surgeon.  He is worried about her too much acid reflux symptoms causing the problem, recurrent sinusitis.  He thinks that deviated nasal septum is causing the problem.  She came for follow-up on various conditions.  Appetite and weight are okay.  No problem.    I have personally reviewed the patient's Past Medical History, Medications, Allergies, Social History, and Family History in the EMR.    Sore Throat   This is a recurrent problem. The current episode started in the past 7 days. The problem has been waxing and waning. Neither side of throat is experiencing more pain than the other. There has been no fever. The pain is at a severity of 9/10. Associated symptoms include congestion, coughing, headaches, a hoarse voice, shortness of breath, swollen glands and trouble swallowing. Pertinent negatives include no abdominal pain, drooling, ear discharge, ear pain, plugged ear sensation, neck pain, stridor or vomiting. She has had no exposure to strep or mono.     Review of Systems   HENT:  Positive for congestion, hoarse voice, sore throat and trouble swallowing. Negative for drooling, ear discharge and ear pain.    Respiratory:  Positive for cough and shortness of breath. Negative for stridor.    Gastrointestinal:  Negative for abdominal pain and vomiting.   Musculoskeletal:  Negative for neck pain.   Neurological:  Positive for headaches.   All other systems reviewed and are negative.    Objective   Virtual exam.  There were no vitals taken for this visit.    Physical Exam  HENT:      Nose: Congestion present.      Comments: Postnasal drip on camera.     Mouth/Throat:      Comments: Throat congested.  Musculoskeletal:      Right lower leg: No edema.      Left lower leg: No edema.     LAB WORK: Laboratory testing  discussed.    Assessment/Plan   Problem List Items Addressed This Visit    None  Visit Diagnoses         Codes    Sleep apnea, unspecified type    -  Primary G47.30    Gastroesophageal reflux disease without esophagitis     K21.9    Relevant Orders    Referral to Gastroenterology    Recurrent sinusitis     J32.9    Relevant Orders    CT sinus wo IV contrast        1. Recurrent sinusitis, postnasal drip, congestion.  CT sinuses ordered.  2. Acid reflux symptoms, two agents Prilosec, Pepcid not working.  I added EGD with GI.  3. Sleep apnea.  Her machine is working.  She will benefit from night oxygen.  She has a symptom for hypoxia.  Night oxygen added for adjusting setup.  4. Follow-up with me in two to three week’s time.  5. Time spent almost 38 minutes, more than half in direct patient care done through the iPhone.    Scribe Attestation  By signing my name below, IHalely, Scribe attest that this documentation has been prepared under the direction and in the presence of Asher Alexander MD.

## 2023-11-21 ENCOUNTER — OFFICE VISIT (OUTPATIENT)
Dept: PRIMARY CARE | Facility: CLINIC | Age: 53
End: 2023-11-21
Payer: COMMERCIAL

## 2023-11-21 VITALS — HEIGHT: 65 IN | DIASTOLIC BLOOD PRESSURE: 74 MMHG | SYSTOLIC BLOOD PRESSURE: 136 MMHG | BODY MASS INDEX: 33.46 KG/M2

## 2023-11-21 DIAGNOSIS — K21.9 GASTROESOPHAGEAL REFLUX DISEASE, UNSPECIFIED WHETHER ESOPHAGITIS PRESENT: ICD-10-CM

## 2023-11-21 DIAGNOSIS — J42 CHRONIC BRONCHITIS, UNSPECIFIED CHRONIC BRONCHITIS TYPE (MULTI): ICD-10-CM

## 2023-11-21 DIAGNOSIS — J01.90 ACUTE SINUSITIS, RECURRENCE NOT SPECIFIED, UNSPECIFIED LOCATION: Primary | ICD-10-CM

## 2023-11-21 DIAGNOSIS — G47.30 SLEEP APNEA, UNSPECIFIED TYPE: ICD-10-CM

## 2023-11-21 DIAGNOSIS — J45.909 MILD ASTHMA, UNSPECIFIED WHETHER COMPLICATED, UNSPECIFIED WHETHER PERSISTENT (HHS-HCC): ICD-10-CM

## 2023-11-21 PROCEDURE — 3075F SYST BP GE 130 - 139MM HG: CPT | Performed by: INTERNAL MEDICINE

## 2023-11-21 PROCEDURE — 3008F BODY MASS INDEX DOCD: CPT | Performed by: INTERNAL MEDICINE

## 2023-11-21 PROCEDURE — 3078F DIAST BP <80 MM HG: CPT | Performed by: INTERNAL MEDICINE

## 2023-11-21 PROCEDURE — 99214 OFFICE O/P EST MOD 30 MIN: CPT | Performed by: INTERNAL MEDICINE

## 2023-11-21 PROCEDURE — 1036F TOBACCO NON-USER: CPT | Performed by: INTERNAL MEDICINE

## 2023-11-21 RX ORDER — ALBUTEROL SULFATE 90 UG/1
2 AEROSOL, METERED RESPIRATORY (INHALATION) EVERY 4 HOURS PRN
Qty: 6.7 G | Refills: 11 | Status: SHIPPED | OUTPATIENT
Start: 2023-11-21 | End: 2024-11-20

## 2023-11-21 RX ORDER — DOXYCYCLINE 100 MG/1
100 CAPSULE ORAL 2 TIMES DAILY
Qty: 20 CAPSULE | Refills: 0 | Status: SHIPPED | OUTPATIENT
Start: 2023-11-21 | End: 2023-12-01

## 2023-11-21 RX ORDER — BUDESONIDE, GLYCOPYRROLATE, AND FORMOTEROL FUMARATE 160; 9; 4.8 UG/1; UG/1; UG/1
2 AEROSOL, METERED RESPIRATORY (INHALATION) 2 TIMES DAILY
Qty: 10.7 G | Refills: 5 | Status: SHIPPED | OUTPATIENT
Start: 2023-11-21

## 2023-11-22 NOTE — PROGRESS NOTES
"Subjective   Patient ID: Bren Johnson is a 53 y.o. female who presents for Follow-up (on various conditions.).    1. Ms. Johnson today came here for cough, yellow sputum, sinus congestion, shortness of breath, wheezing, not feeling good.  She was using CPAP machine very well.  2. She saw ENT physician.  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  3. Acid reflux symptoms not getting any better.  ENT recommended immediate upper endoscopy, she is worried about it.  She came here for follow-up on various conditions.    I have personally reviewed the patient's Past Medical History, Medications, Allergies, Social History, and Family History in the EMR.    Review of Systems   All other systems reviewed and are negative.    Objective   /74   Ht 1.638 m (5' 4.5\")   BMI 33.46 kg/m²     Physical Exam  Vitals reviewed.   HENT:      Nose: Congestion present.      Comments: Postnasal drip.     Mouth/Throat:      Comments: Throat congested.  Cardiovascular:      Heart sounds: Normal heart sounds, S1 normal and S2 normal. No murmur heard.     No friction rub.   Pulmonary:      Effort: Pulmonary effort is normal.      Breath sounds: Normal air entry. Wheezing (Bilateral) present.      Comments: Basal crepitus.  Abdominal:      Palpations: There is no hepatomegaly, splenomegaly or mass.   Musculoskeletal:      Right lower leg: No edema.      Left lower leg: No edema.   Lymphadenopathy:      Lower Body: No right inguinal adenopathy. No left inguinal adenopathy.   Neurological:      Cranial Nerves: Cranial nerves 2-12 are intact.      Sensory: No sensory deficit.      Motor: Motor function is intact.      Deep Tendon Reflexes: Reflexes are normal and symmetric.     LAB WORK:  Laboratory testing discussed.    Assessment/Plan   Problem List Items Addressed This Visit             ICD-10-CM       Pulmonary and Pneumonias    Asthma J45.909    Relevant Medications    budesonide-glycopyr-formoterol (Brezi Aerosphere) 160-9-4.8 " mcg/actuation HFA aerosol inhaler    albuterol (Proventil HFA) 90 mcg/actuation inhaler    doxycycline (Vibramycin) 100 mg capsule       Sleep    Sleep apnea G47.30     Other Visit Diagnoses         Codes    Acute sinusitis, recurrence not specified, unspecified location    -  Primary J01.90    Gastroesophageal reflux disease, unspecified whether esophagitis present     K21.9    Relevant Orders    EGD    Chronic bronchitis, unspecified chronic bronchitis type (CMS/[facility])     J42        1. Acute sinusitis, postnasal drip and bronchitis.  Saline gargles and steam inhalation.  Doxycycline,  Claritin, Robitussin, and Flonase.  2. Chronic bronchitis.  Breztri and albuterol given.  3. Sleep apnea.  Sleep apnea machine, she is using it religiously.  4. Oxygen.  Oxygen therapy must.  5. Kidney system clear.  6. GERD.  We will change the PPI medicine.  Unfortunately, she was taking thyroid and PPI together.  We  them.  Raise the head end up.  7. Follow-up appointment with me in about two to three weeks time.  8. Coordination of care.  I personally call GI to set up urgent endoscopy soon.    Scribe Attestation  By signing my name below, I, Alice Bergeron, Katharina attest that this documentation has been prepared under the direction and in the presence of Asher Alexander MD.

## 2023-11-24 DIAGNOSIS — I10 BENIGN ESSENTIAL HYPERTENSION: ICD-10-CM

## 2023-11-26 DIAGNOSIS — E03.9 ACQUIRED HYPOTHYROIDISM: ICD-10-CM

## 2023-11-26 DIAGNOSIS — J30.1 ALLERGIC RHINITIS DUE TO POLLEN, UNSPECIFIED SEASONALITY: ICD-10-CM

## 2023-11-26 DIAGNOSIS — J45.20 MILD INTERMITTENT ASTHMA WITHOUT COMPLICATION (HHS-HCC): ICD-10-CM

## 2023-11-27 RX ORDER — VERAPAMIL HYDROCHLORIDE 180 MG/1
360 TABLET, FILM COATED, EXTENDED RELEASE ORAL NIGHTLY
Qty: 180 TABLET | Refills: 1 | Status: SHIPPED | OUTPATIENT
Start: 2023-11-27 | End: 2024-05-02 | Stop reason: SDUPTHER

## 2023-11-29 ENCOUNTER — ANCILLARY PROCEDURE (OUTPATIENT)
Dept: RADIOLOGY | Facility: CLINIC | Age: 53
End: 2023-11-29
Payer: COMMERCIAL

## 2023-11-29 DIAGNOSIS — J32.9 RECURRENT SINUSITIS: ICD-10-CM

## 2023-11-29 PROCEDURE — 70486 CT MAXILLOFACIAL W/O DYE: CPT

## 2023-11-29 PROCEDURE — 76376 3D RENDER W/INTRP POSTPROCES: CPT | Performed by: RADIOLOGY

## 2023-11-29 PROCEDURE — 70486 CT MAXILLOFACIAL W/O DYE: CPT | Performed by: RADIOLOGY

## 2023-11-29 RX ORDER — LEVOCETIRIZINE DIHYDROCHLORIDE 5 MG/1
5 TABLET, FILM COATED ORAL EVERY EVENING
Qty: 90 TABLET | Refills: 1 | Status: SHIPPED | OUTPATIENT
Start: 2023-11-29 | End: 2024-04-29 | Stop reason: SDUPTHER

## 2023-11-29 RX ORDER — LEVOTHYROXINE SODIUM 75 UG/1
75 TABLET ORAL
Qty: 90 TABLET | Refills: 1 | Status: SHIPPED | OUTPATIENT
Start: 2023-11-29 | End: 2024-04-29 | Stop reason: SDUPTHER

## 2023-11-29 RX ORDER — MONTELUKAST SODIUM 10 MG/1
10 TABLET ORAL NIGHTLY
Qty: 90 TABLET | Refills: 1 | Status: SHIPPED | OUTPATIENT
Start: 2023-11-29 | End: 2024-04-29 | Stop reason: SDUPTHER

## 2023-12-19 ENCOUNTER — HOSPITAL ENCOUNTER (OUTPATIENT)
Dept: GASTROENTEROLOGY | Facility: HOSPITAL | Age: 53
Setting detail: OUTPATIENT SURGERY
Discharge: HOME | End: 2023-12-19
Payer: COMMERCIAL

## 2023-12-19 VITALS
BODY MASS INDEX: 33.3 KG/M2 | RESPIRATION RATE: 14 BRPM | OXYGEN SATURATION: 94 % | TEMPERATURE: 97.3 F | SYSTOLIC BLOOD PRESSURE: 145 MMHG | WEIGHT: 207.23 LBS | HEIGHT: 66 IN | HEART RATE: 71 BPM | DIASTOLIC BLOOD PRESSURE: 92 MMHG

## 2023-12-19 DIAGNOSIS — K21.9 GASTROESOPHAGEAL REFLUX DISEASE, UNSPECIFIED WHETHER ESOPHAGITIS PRESENT: ICD-10-CM

## 2023-12-19 DIAGNOSIS — K21.9 GASTROESOPHAGEAL REFLUX DISEASE WITHOUT ESOPHAGITIS: Primary | ICD-10-CM

## 2023-12-19 PROCEDURE — 7100000009 HC PHASE TWO TIME - INITIAL BASE CHARGE

## 2023-12-19 PROCEDURE — 2500000004 HC RX 250 GENERAL PHARMACY W/ HCPCS (ALT 636 FOR OP/ED): Performed by: STUDENT IN AN ORGANIZED HEALTH CARE EDUCATION/TRAINING PROGRAM

## 2023-12-19 PROCEDURE — 43235 EGD DIAGNOSTIC BRUSH WASH: CPT | Performed by: STUDENT IN AN ORGANIZED HEALTH CARE EDUCATION/TRAINING PROGRAM

## 2023-12-19 PROCEDURE — 99152 MOD SED SAME PHYS/QHP 5/>YRS: CPT | Performed by: STUDENT IN AN ORGANIZED HEALTH CARE EDUCATION/TRAINING PROGRAM

## 2023-12-19 PROCEDURE — 3700000012 HC SEDATION LEVEL 5+ TIME - INITIAL 15 MINUTES 5/> YEARS

## 2023-12-19 PROCEDURE — 7100000010 HC PHASE TWO TIME - EACH INCREMENTAL 1 MINUTE

## 2023-12-19 RX ORDER — MIDAZOLAM HYDROCHLORIDE 1 MG/ML
INJECTION INTRAMUSCULAR; INTRAVENOUS AS NEEDED
Status: COMPLETED | OUTPATIENT
Start: 2023-12-19 | End: 2023-12-19

## 2023-12-19 RX ORDER — SODIUM CHLORIDE, SODIUM LACTATE, POTASSIUM CHLORIDE, CALCIUM CHLORIDE 600; 310; 30; 20 MG/100ML; MG/100ML; MG/100ML; MG/100ML
20 INJECTION, SOLUTION INTRAVENOUS CONTINUOUS
Status: DISCONTINUED | OUTPATIENT
Start: 2023-12-19 | End: 2023-12-20 | Stop reason: HOSPADM

## 2023-12-19 RX ORDER — FENTANYL CITRATE 50 UG/ML
INJECTION, SOLUTION INTRAMUSCULAR; INTRAVENOUS AS NEEDED
Status: COMPLETED | OUTPATIENT
Start: 2023-12-19 | End: 2023-12-19

## 2023-12-19 RX ADMIN — FENTANYL CITRATE 50 MCG: 50 INJECTION, SOLUTION INTRAMUSCULAR; INTRAVENOUS at 08:44

## 2023-12-19 RX ADMIN — MIDAZOLAM HYDROCHLORIDE 2 MG: 1 INJECTION INTRAMUSCULAR; INTRAVENOUS at 08:44

## 2023-12-19 RX ADMIN — MIDAZOLAM HYDROCHLORIDE 1 MG: 1 INJECTION INTRAMUSCULAR; INTRAVENOUS at 08:49

## 2023-12-19 RX ADMIN — FENTANYL CITRATE 25 MCG: 50 INJECTION, SOLUTION INTRAMUSCULAR; INTRAVENOUS at 08:49

## 2023-12-19 ASSESSMENT — COLUMBIA-SUICIDE SEVERITY RATING SCALE - C-SSRS
2. HAVE YOU ACTUALLY HAD ANY THOUGHTS OF KILLING YOURSELF?: NO
1. IN THE PAST MONTH, HAVE YOU WISHED YOU WERE DEAD OR WISHED YOU COULD GO TO SLEEP AND NOT WAKE UP?: NO
6. HAVE YOU EVER DONE ANYTHING, STARTED TO DO ANYTHING, OR PREPARED TO DO ANYTHING TO END YOUR LIFE?: NO

## 2023-12-19 ASSESSMENT — PAIN SCALES - GENERAL
PAINLEVEL_OUTOF10: 0 - NO PAIN
PAINLEVEL_OUTOF10: 0 - NO PAIN

## 2023-12-19 ASSESSMENT — PAIN - FUNCTIONAL ASSESSMENT
PAIN_FUNCTIONAL_ASSESSMENT: 0-10

## 2023-12-19 NOTE — PRE-SEDATION DOCUMENTATION
Patient: Bren Johnson  MRN: [id number]    Pre-sedation Evaluation:  Sedation necessary for: Analgesia  Requesting service: gastro    History of Present Illness: egd for gerd     Past Medical History:   Diagnosis Date    Adjustment insomnia 05/08/2014    Insomnia, transient    Allergic rhinitis     Hypertension     Hypothyroid     Personal history of other specified conditions     History of wheezing    Personal history of other specified conditions     History of shortness of breath       Principle problems:  Patient Active Problem List    Diagnosis Date Noted    Gastroesophageal reflux disease without esophagitis 11/16/2023    Sleep apnea 11/16/2023    Acute recurrent sinusitis 10/10/2023    Snoring 10/10/2023    Temporomandibular joint disorder 10/10/2023    Cervical lymphadenitis 10/10/2023    Dysphagia 09/26/2023    Allergic rhinitis 02/03/2023    Benign essential hypertension 02/03/2023    Hypothyroidism 02/03/2023    Mild intermittent asthma without complication 02/03/2023    Vitamin D deficiency 02/03/2023    Asthma 02/03/2023    Vitamin B12 deficiency 02/03/2023     Allergies:  Allergies   Allergen Reactions    Lisinopril Other     PTA/Current Medications:  (Not in a hospital admission)    Current Outpatient Medications   Medication Sig Dispense Refill    albuterol (Proventil HFA) 90 mcg/actuation inhaler Inhale 2 puffs every 4 hours if needed for wheezing or shortness of breath. 6.7 g 11    budesonide-formoteroL (Symbicort) 80-4.5 mcg/actuation inhaler Inhale 2 puffs 2 times a day. 1 each     budesonide-glycopyr-formoterol (Breztri Aerosphere) 160-9-4.8 mcg/actuation HFA aerosol inhaler Inhale 2 puffs 2 times a day. 10.7 g 5    cholecalciferol (Vitamin D-3) 5,000 Units tablet Take 1 tablet (5,000 Units) by mouth once daily.      famotidine (Pepcid) 20 mg tablet Take 1 tablet (20 mg) by mouth once daily at bedtime. 60 tablet 2    levocetirizine (Xyzal) 5 mg tablet TAKE 1 TABLET (5 MG) BY MOUTH DAILY IN THE  EVENING 90 tablet 1    levothyroxine (Synthroid, Levoxyl) 75 mcg tablet TAKE 1 TABLET (75 MCG) BY MOUTH ONCE DAILY IN THE MORNING. TAKE BEFORE MEALS. 90 tablet 1    montelukast (Singulair) 10 mg tablet TAKE 1 TABLET (10 MG) BY MOUTH ONCE DAILY AT BEDTIME. 90 tablet 1    olopatadine-mometasone (Ryaltris) 665-25 mcg/spray spray,non-aerosol ADMINISTER 25 MCG INTO AFFECTED NOSTRIL(S) 2 TIMES A DAY. 20 g 1    omeprazole (PriLOSEC) 20 mg DR capsule Take 1 capsule (20 mg) by mouth 2 times a day. Do not crush or chew. 60 capsule 2    verapamil SR (Calan-SR) 180 mg ER tablet TAKE 2 TABLETS (360 MG) BY MOUTH ONCE DAILY AT BEDTIME. 180 tablet 1    hydroCHLOROthiazide (HYDRODiuril) 12.5 mg tablet Take 1 tablet (12.5 mg) by mouth once daily. (Patient not taking: Reported on 2023) 90 tablet 0     Current Facility-Administered Medications   Medication Dose Route Frequency Provider Last Rate Last Admin    lactated Ringer's infusion  20 mL/hr intravenous Continuous Ruma Holland MD         Past Surgical History:   has a past surgical history that includes  section, classic (2014).    Recent sedation/surgery (24 hours) No    Review of Systems:  Please check all that apply: No significant medical history        NPO guidelines met: Yes    Physical Exam    Airway  Mallampati: II     Cardiovascular   Rhythm: regular  Rate: normal     Dental    Pulmonary   Breath sounds clear to auscultation         Plan    ASA 2     Moderate

## 2023-12-19 NOTE — DISCHARGE INSTRUCTIONS
During the first 24 hours after your procedure, you should:     - Resume normal diet, unless otherwise directed by your doctor.  - Resume your home medications, unless otherwise directed by your doctor.  - Refrain from driving or operative heavy machinery.  - Drink plenty of liquids.  - Avoid consuming alcohol.  - Avoid strenuous activity or heavy lifting.     After 24 hours, you can resume regular activity.     Call your doctor office immediately ([phone]) or come to the nearest emergency room if you experience:     - Abdominal tenderness  - Blood in your stool or vomit  - Difficulty urinating or passing stools  - Difficulty breathing  - Chest pain  - Fever

## 2023-12-19 NOTE — SIGNIFICANT EVENT
Pt arrived to post endo sedated but arousable to voice. Bedside report received from procedure nurse. Initial vital signs and assessment charted. Safety maintained

## 2023-12-19 NOTE — H&P
Subjective     History of Present Illness:   Bren Johnson is a 53 y.o. female who presents to endoscopy for gerd.    Review of Systems  Constitutional: denies in acute distress  Cardiovascular: denies chest pain  Respiratory: denies shortness of breath  GI: see HPI  Neurologic: denies altered mental status  Dermatology: denies jaundice    Allergies   Allergen Reactions    Lisinopril Other       Past Medical History:   Diagnosis Date    Adjustment insomnia 2014    Insomnia, transient    Allergic rhinitis     Hypertension     Hypothyroid     Personal history of other specified conditions     History of wheezing    Personal history of other specified conditions     History of shortness of breath       Past Surgical History:   Procedure Laterality Date     SECTION, CLASSIC  2014     Section       Family History   Problem Relation Name Age of Onset    Diabetes Mother      Diabetes Father         Social History     Tobacco Use    Smoking status: Never     Passive exposure: Never    Smokeless tobacco: Never   Substance Use Topics    Alcohol use: Not Currently       Current Outpatient Medications   Medication Sig Dispense Refill    albuterol (Proventil HFA) 90 mcg/actuation inhaler Inhale 2 puffs every 4 hours if needed for wheezing or shortness of breath. 6.7 g 11    budesonide-formoteroL (Symbicort) 80-4.5 mcg/actuation inhaler Inhale 2 puffs 2 times a day. 1 each     budesonide-glycopyr-formoterol (Breztri Aerosphere) 160-9-4.8 mcg/actuation HFA aerosol inhaler Inhale 2 puffs 2 times a day. 10.7 g 5    cholecalciferol (Vitamin D-3) 5,000 Units tablet Take 1 tablet (5,000 Units) by mouth once daily.      famotidine (Pepcid) 20 mg tablet Take 1 tablet (20 mg) by mouth once daily at bedtime. 60 tablet 2    levocetirizine (Xyzal) 5 mg tablet TAKE 1 TABLET (5 MG) BY MOUTH DAILY IN THE EVENING 90 tablet 1    levothyroxine (Synthroid, Levoxyl) 75 mcg tablet TAKE 1 TABLET (75 MCG) BY MOUTH ONCE  "DAILY IN THE MORNING. TAKE BEFORE MEALS. 90 tablet 1    montelukast (Singulair) 10 mg tablet TAKE 1 TABLET (10 MG) BY MOUTH ONCE DAILY AT BEDTIME. 90 tablet 1    olopatadine-mometasone (Ryaltris) 665-25 mcg/spray spray,non-aerosol ADMINISTER 25 MCG INTO AFFECTED NOSTRIL(S) 2 TIMES A DAY. 20 g 1    omeprazole (PriLOSEC) 20 mg DR capsule Take 1 capsule (20 mg) by mouth 2 times a day. Do not crush or chew. 60 capsule 2    verapamil SR (Calan-SR) 180 mg ER tablet TAKE 2 TABLETS (360 MG) BY MOUTH ONCE DAILY AT BEDTIME. 180 tablet 1    hydroCHLOROthiazide (HYDRODiuril) 12.5 mg tablet Take 1 tablet (12.5 mg) by mouth once daily. (Patient not taking: Reported on 12/19/2023) 90 tablet 0     Current Facility-Administered Medications   Medication Dose Route Frequency Provider Last Rate Last Admin    lactated Ringer's infusion  20 mL/hr intravenous Continuous Ruma Holland MD           Objective   Visit Vitals  BP (!) 155/101   Pulse 84   Temp 36.4 °C (97.5 °F) (Temporal)   Resp 20        Physical Exam  General: not in acute distress  CV: regular rate and rhythm  Resp: non-labored breathing  GI: soft, active bowel sounds, non-tender to palpation, no rebound or guarding  Msk: moving all extremities   Derm: no jaundice    Labs  Lab Results   Component Value Date    WBC 8.1 10/27/2023    HGB 13.4 10/27/2023    HCT 43.6 10/27/2023    MCV 88 10/27/2023     10/27/2023     Lab Results   Component Value Date    GLUCOSE 104 (H) 10/27/2023    CALCIUM 9.1 10/27/2023     10/27/2023    K 3.8 10/27/2023    CO2 31 10/27/2023     10/27/2023    BUN 13 10/27/2023    CREATININE 0.59 10/27/2023     Lab Results   Component Value Date    ALT 24 10/27/2023    AST 17 10/27/2023    ALKPHOS 68 10/27/2023    BILITOT 0.7 10/27/2023     No results found for: \"INR\", \"PROTIME\"    Assessment/Plan   Bren Johnson is a 53 y.o. female who presents to endoscopy for gerd.        Ruma Holland MD   "

## 2023-12-20 ASSESSMENT — PAIN SCALES - GENERAL: PAINLEVEL_OUTOF10: 0 - NO PAIN

## 2023-12-23 DIAGNOSIS — I10 BENIGN ESSENTIAL HYPERTENSION: ICD-10-CM

## 2023-12-23 RX ORDER — HYDROCHLOROTHIAZIDE 12.5 MG/1
12.5 TABLET ORAL DAILY
Qty: 90 TABLET | Refills: 0 | Status: SHIPPED | OUTPATIENT
Start: 2023-12-23

## 2023-12-26 ENCOUNTER — ANCILLARY PROCEDURE (OUTPATIENT)
Dept: RADIOLOGY | Facility: CLINIC | Age: 53
End: 2023-12-26
Payer: COMMERCIAL

## 2023-12-26 DIAGNOSIS — I10 BENIGN ESSENTIAL HYPERTENSION: ICD-10-CM

## 2023-12-26 DIAGNOSIS — M79.602 LEFT ARM PAIN: ICD-10-CM

## 2023-12-26 PROCEDURE — 75571 CT HRT W/O DYE W/CA TEST: CPT

## 2024-01-15 ENCOUNTER — APPOINTMENT (OUTPATIENT)
Dept: PRIMARY CARE | Facility: CLINIC | Age: 54
End: 2024-01-15
Payer: COMMERCIAL

## 2024-01-17 ENCOUNTER — APPOINTMENT (OUTPATIENT)
Dept: OTOLARYNGOLOGY | Facility: CLINIC | Age: 54
End: 2024-01-17
Payer: COMMERCIAL

## 2024-01-22 ENCOUNTER — OFFICE VISIT (OUTPATIENT)
Dept: GASTROENTEROLOGY | Facility: CLINIC | Age: 54
End: 2024-01-22
Payer: COMMERCIAL

## 2024-01-22 VITALS — WEIGHT: 204 LBS | BODY MASS INDEX: 36.14 KG/M2 | HEART RATE: 93 BPM | HEIGHT: 63 IN

## 2024-01-22 DIAGNOSIS — K21.9 GASTROESOPHAGEAL REFLUX DISEASE, UNSPECIFIED WHETHER ESOPHAGITIS PRESENT: ICD-10-CM

## 2024-01-22 DIAGNOSIS — R13.12 OROPHARYNGEAL DYSPHAGIA: Primary | ICD-10-CM

## 2024-01-22 PROCEDURE — 3008F BODY MASS INDEX DOCD: CPT | Performed by: INTERNAL MEDICINE

## 2024-01-22 PROCEDURE — 99204 OFFICE O/P NEW MOD 45 MIN: CPT | Performed by: INTERNAL MEDICINE

## 2024-01-22 PROCEDURE — 1036F TOBACCO NON-USER: CPT | Performed by: INTERNAL MEDICINE

## 2024-01-22 NOTE — PROGRESS NOTES
"REASON FOR VISIT: To discuss reflux    HPI:  Bren Johnson is a 53 y.o. female with a past medical history of GERD being evaluated in the office for history of possible reflux and history of coughing with swallowing.  Patient reports prior history of \" choking on liquids\" which was happening about 6 months ago.  Seen by ENT.  At that time placed on omeprazole low-dose 20 mg daily and she states that that has helped.  Also history of nonproductive cough which also seems to have improved.  States that it may have been secondary to bronchitis.  Attempted upper endoscopy to recently per report difficult due to patient intolerance and concern for hypoxia.  Quick assessment per report notes normal-appearing esophagus and small hiatal hernia.  Does not have any solid food dysphagia.  States she believes she was getting some heartburn symptoms but could not clarify how frequently.  She does lay down after dinner about 1 hour after dinner.          PRIOR ENDOSCOPY    PAST MEDICAL HISTORY  Past Medical History:   Diagnosis Date    Adjustment insomnia 2014    Insomnia, transient    Allergic rhinitis     Hypertension     Hypothyroid     Personal history of other specified conditions     History of wheezing    Personal history of other specified conditions     History of shortness of breath       PAST SURGICAL HISTORY  Past Surgical History:   Procedure Laterality Date     SECTION, CLASSIC  2014     Section       FAMILY HISTORY  Family History   Problem Relation Name Age of Onset    Diabetes Mother      Diabetes Father         SOCIAL HISTORY  Social History     Tobacco Use    Smoking status: Never     Passive exposure: Never    Smokeless tobacco: Never   Substance Use Topics    Alcohol use: Not Currently       REVIEW OF SYSTEMS  CONSTITUTIONAL: negative for fever, chills, fatigue, or unintentional weight loss,   HEENT: negative for icteric sclera, eye pain/redness, or changes in " vision/hearing  RESPIRATORY: negative for cough, hemoptysis, wheezing, orthopnea, or dyspnea on exertion  CARDIOVASCULAR: negative for chest pain, palpitations, or syncope   GASTROINTESTINAL: as noted per HPI  GENITOURINARY: negative for dysuria, polyuria, incontinence, or hematuria  MUSCULOSKELETAL: negative for arthralgia, myalgia, or joint swelling/stiffness   INTEGUMENTARY/SKIN: negative jaundice, rash, or skin lesion  HEMATOLOGIC/LYMPHATIC: negative for prolonged bleeding, easy bruising, or swollen lymph nodes  ENDOCRINE: negative for cold/heat intolerance, polydipsia, polyuria, or goiter  NEUROLOGIC: negative for headaches, dizziness, tremor, or gait abnormality  PSYCHIATRIC: negative for anxiety, depression, personality changes, or sleep disturbances      A 10 point review of systems was completed and was otherwise negative.    ALLERGIES  Allergies   Allergen Reactions    Lisinopril Other       MEDICATIONS  Current Outpatient Medications   Medication Sig Dispense Refill    albuterol (Proventil HFA) 90 mcg/actuation inhaler Inhale 2 puffs every 4 hours if needed for wheezing or shortness of breath. 6.7 g 11    budesonide-formoteroL (Symbicort) 80-4.5 mcg/actuation inhaler Inhale 2 puffs 2 times a day. 1 each     budesonide-glycopyr-formoterol (Breztri Aerosphere) 160-9-4.8 mcg/actuation HFA aerosol inhaler Inhale 2 puffs 2 times a day. 10.7 g 5    cholecalciferol (Vitamin D-3) 5,000 Units tablet Take 1 tablet (5,000 Units) by mouth once daily.      levocetirizine (Xyzal) 5 mg tablet TAKE 1 TABLET (5 MG) BY MOUTH DAILY IN THE EVENING 90 tablet 1    levothyroxine (Synthroid, Levoxyl) 75 mcg tablet TAKE 1 TABLET (75 MCG) BY MOUTH ONCE DAILY IN THE MORNING. TAKE BEFORE MEALS. 90 tablet 1    montelukast (Singulair) 10 mg tablet TAKE 1 TABLET (10 MG) BY MOUTH ONCE DAILY AT BEDTIME. 90 tablet 1    olopatadine-mometasone (Ryaltris) 665-25 mcg/spray spray,non-aerosol ADMINISTER 25 MCG INTO AFFECTED NOSTRIL(S) 2 TIMES A  "DAY. 20 g 1    omeprazole (PriLOSEC) 20 mg DR capsule Take 1 capsule (20 mg) by mouth 2 times a day. Do not crush or chew. 60 capsule 2    verapamil SR (Calan-SR) 180 mg ER tablet TAKE 2 TABLETS (360 MG) BY MOUTH ONCE DAILY AT BEDTIME. 180 tablet 1    famotidine (Pepcid) 20 mg tablet Take 1 tablet (20 mg) by mouth once daily at bedtime. (Patient not taking: Reported on 1/22/2024) 60 tablet 2    hydroCHLOROthiazide (HYDRODiuril) 12.5 mg tablet TAKE 1 TABLET BY MOUTH ONCE DAILY. (Patient not taking: Reported on 1/22/2024) 90 tablet 0     No current facility-administered medications for this visit.       VITALS  Pulse 93   Ht 1.588 m (5' 2.5\")   Wt 92.5 kg (204 lb)   BMI 36.72 kg/m²      PHYSICAL EXAM  Alert and oriented in no acute distress    ASSESSMENT/ PLAN  Patient with symptoms of GERD with LPR symptoms in the past of cough.  Describes liquid dysphagia which sounds like liquid oropharyngeal dysphagia which appears to have improved after PPI therapy.  I do not see need to pursue modified barium swallow as her symptoms seem to have subsided.  Also no solid food dysphagia and therefore we will hold off on repeat upper endoscopy at this time.  Recommended weight loss and other lifestyle modifications including eating smaller meals, low-fat meals, staying upright for 2 hours after meals.  I did recommend changing omeprazole to an hour before dinner as she has evening symptoms mostly.  She expressed understanding and agreement with the plan.  She will otherwise follow-up with me in the office as needed.        Signature: Rock Rai MD    Date: 1/22/2024  Time: 2:58 PM    "

## 2024-02-08 DIAGNOSIS — K21.9 GASTROESOPHAGEAL REFLUX DISEASE, UNSPECIFIED WHETHER ESOPHAGITIS PRESENT: ICD-10-CM

## 2024-02-08 RX ORDER — OMEPRAZOLE 20 MG/1
20 CAPSULE, DELAYED RELEASE ORAL 2 TIMES DAILY
Qty: 180 CAPSULE | Refills: 1 | Status: SHIPPED | OUTPATIENT
Start: 2024-02-08 | End: 2025-02-07

## 2024-02-23 ENCOUNTER — APPOINTMENT (OUTPATIENT)
Dept: GASTROENTEROLOGY | Facility: CLINIC | Age: 54
End: 2024-02-23
Payer: COMMERCIAL

## 2024-04-29 ENCOUNTER — TELEMEDICINE (OUTPATIENT)
Dept: PRIMARY CARE | Facility: CLINIC | Age: 54
End: 2024-04-29
Payer: COMMERCIAL

## 2024-04-29 DIAGNOSIS — E03.9 ACQUIRED HYPOTHYROIDISM: ICD-10-CM

## 2024-04-29 DIAGNOSIS — E55.9 VITAMIN D DEFICIENCY: ICD-10-CM

## 2024-04-29 DIAGNOSIS — R73.03 PRE-DIABETES: ICD-10-CM

## 2024-04-29 DIAGNOSIS — G47.30 SLEEP APNEA, UNSPECIFIED TYPE: Primary | ICD-10-CM

## 2024-04-29 DIAGNOSIS — E78.5 HYPERLIPIDEMIA WITH TARGET LOW DENSITY LIPOPROTEIN (LDL) CHOLESTEROL LESS THAN 100 MG/DL: ICD-10-CM

## 2024-04-29 DIAGNOSIS — I10 HYPERTENSION, UNSPECIFIED TYPE: ICD-10-CM

## 2024-04-29 DIAGNOSIS — E03.9 HYPOTHYROIDISM, UNSPECIFIED TYPE: ICD-10-CM

## 2024-04-29 DIAGNOSIS — J30.1 ALLERGIC RHINITIS DUE TO POLLEN, UNSPECIFIED SEASONALITY: ICD-10-CM

## 2024-04-29 DIAGNOSIS — J45.20 MILD INTERMITTENT ASTHMA WITHOUT COMPLICATION (HHS-HCC): ICD-10-CM

## 2024-04-29 PROCEDURE — 99213 OFFICE O/P EST LOW 20 MIN: CPT | Performed by: INTERNAL MEDICINE

## 2024-04-29 RX ORDER — LEVOTHYROXINE SODIUM 75 UG/1
75 TABLET ORAL
Qty: 90 TABLET | Refills: 1 | Status: SHIPPED | OUTPATIENT
Start: 2024-04-29

## 2024-04-29 RX ORDER — MONTELUKAST SODIUM 10 MG/1
10 TABLET ORAL NIGHTLY
Qty: 90 TABLET | Refills: 1 | Status: SHIPPED | OUTPATIENT
Start: 2024-04-29

## 2024-04-29 RX ORDER — LEVOCETIRIZINE DIHYDROCHLORIDE 5 MG/1
5 TABLET, FILM COATED ORAL EVERY EVENING
Qty: 90 TABLET | Refills: 1 | Status: SHIPPED | OUTPATIENT
Start: 2024-04-29

## 2024-04-30 NOTE — PROGRESS NOTES
Subjective   Patient ID: Bren Johnson is a 53 y.o. female who presents for Follow-up (on other conditions.).    Ms. Bren Johnson today came here for a virtual visit.  She is not feeling well.  She is gaining weight.  She wants to lose weight.  Follow-up on other conditions.  She wants a refill on medication.  She came here for follow-up.    I have personally reviewed the patient's Past Medical History, Medications, Allergies, Social History, and Family History in the EMR.    Review of Systems   All other systems reviewed and are negative.    Objective   Virtual exam.  There were no vitals taken for this visit.    Physical Exam  Constitutional:       Comments: Feeling okay.  No problem.   Musculoskeletal:      Right lower leg: No edema.      Left lower leg: No edema.     LAB WORK:  Laboratory testing discussed.    Assessment/Plan   Problem List Items Addressed This Visit             ICD-10-CM       ENT    Allergic rhinitis J30.9    Relevant Medications    levocetirizine (Xyzal) 5 mg tablet       Endocrine/Metabolic    Hypothyroidism E03.9    Relevant Medications    levothyroxine (Synthroid, Levoxyl) 75 mcg tablet    Other Relevant Orders    Thyroid Stimulating Hormone    Vitamin D deficiency E55.9    Relevant Orders    Vitamin D 25-Hydroxy,Total (for eval of Vitamin D levels)       Pulmonary and Pneumonias    Mild intermittent asthma without complication ([facility]) J45.20    Relevant Medications    montelukast (Singulair) 10 mg tablet       Sleep    Sleep apnea - Primary G47.30     Other Visit Diagnoses         Codes    Hypertension, unspecified type     I10    Relevant Orders    CBC    Urinalysis with Reflex Microscopic    Hyperlipidemia with target low density lipoprotein (LDL) cholesterol less than 100 mg/dL     E78.5    Relevant Orders    Comprehensive Metabolic Panel    Lipid Panel    Pre-diabetes     R73.03    Relevant Orders    Hemoglobin A1C        1. Sleep apnea.  CPAP machine okay.  2. Hypertension,  migraine.  Verapamil working.  3. Hypothyroid.  We will check TSH.  We ordered levothyroxine.  4. Allergic rhinitis and allergies.  Xyzal, Singulair and Nasacort given.  5. Pre-diabetic.  We will check hemoglobin A1c.  6. Weight gain.  I ordered thyroid and fasting sugar.  7. Blood work ordered.  8. I shall see her back in a week after test.    Scribe Attestation  By signing my name below, IAlice Scribe attest that this documentation has been prepared under the direction and in the presence of Asher Alexander MD.

## 2024-05-02 DIAGNOSIS — I10 BENIGN ESSENTIAL HYPERTENSION: ICD-10-CM

## 2024-05-02 RX ORDER — VERAPAMIL HYDROCHLORIDE 180 MG/1
360 TABLET, FILM COATED, EXTENDED RELEASE ORAL NIGHTLY
Qty: 180 TABLET | Refills: 1 | Status: SHIPPED | OUTPATIENT
Start: 2024-05-02

## 2024-05-11 ENCOUNTER — LAB (OUTPATIENT)
Dept: LAB | Facility: LAB | Age: 54
End: 2024-05-11
Payer: COMMERCIAL

## 2024-05-11 DIAGNOSIS — I10 HYPERTENSION, UNSPECIFIED TYPE: ICD-10-CM

## 2024-05-11 DIAGNOSIS — E03.9 HYPOTHYROIDISM, UNSPECIFIED TYPE: ICD-10-CM

## 2024-05-11 DIAGNOSIS — E78.5 HYPERLIPIDEMIA WITH TARGET LOW DENSITY LIPOPROTEIN (LDL) CHOLESTEROL LESS THAN 100 MG/DL: ICD-10-CM

## 2024-05-11 DIAGNOSIS — R73.03 PRE-DIABETES: ICD-10-CM

## 2024-05-11 DIAGNOSIS — E55.9 VITAMIN D DEFICIENCY: ICD-10-CM

## 2024-05-11 LAB
25(OH)D3 SERPL-MCNC: 38 NG/ML (ref 30–100)
ALBUMIN SERPL BCP-MCNC: 4.3 G/DL (ref 3.4–5)
ALP SERPL-CCNC: 75 U/L (ref 33–110)
ALT SERPL W P-5'-P-CCNC: 25 U/L (ref 7–45)
ANION GAP SERPL CALC-SCNC: 12 MMOL/L (ref 10–20)
APPEARANCE UR: CLEAR
AST SERPL W P-5'-P-CCNC: 18 U/L (ref 9–39)
BILIRUB SERPL-MCNC: 0.6 MG/DL (ref 0–1.2)
BILIRUB UR STRIP.AUTO-MCNC: NEGATIVE MG/DL
BUN SERPL-MCNC: 13 MG/DL (ref 6–23)
CALCIUM SERPL-MCNC: 9.1 MG/DL (ref 8.6–10.3)
CHLORIDE SERPL-SCNC: 101 MMOL/L (ref 98–107)
CHOLEST SERPL-MCNC: 216 MG/DL (ref 0–199)
CHOLESTEROL/HDL RATIO: 3.8
CO2 SERPL-SCNC: 31 MMOL/L (ref 21–32)
COLOR UR: NORMAL
CREAT SERPL-MCNC: 0.57 MG/DL (ref 0.5–1.05)
EGFRCR SERPLBLD CKD-EPI 2021: >90 ML/MIN/1.73M*2
ERYTHROCYTE [DISTWIDTH] IN BLOOD BY AUTOMATED COUNT: 13.5 % (ref 11.5–14.5)
EST. AVERAGE GLUCOSE BLD GHB EST-MCNC: 105 MG/DL
GLUCOSE SERPL-MCNC: 106 MG/DL (ref 74–99)
GLUCOSE UR STRIP.AUTO-MCNC: NORMAL MG/DL
HBA1C MFR BLD: 5.3 %
HCT VFR BLD AUTO: 41.7 % (ref 36–46)
HDLC SERPL-MCNC: 57.3 MG/DL
HGB BLD-MCNC: 13.3 G/DL (ref 12–16)
KETONES UR STRIP.AUTO-MCNC: NEGATIVE MG/DL
LDLC SERPL CALC-MCNC: 124 MG/DL
LEUKOCYTE ESTERASE UR QL STRIP.AUTO: NEGATIVE
MCH RBC QN AUTO: 27.7 PG (ref 26–34)
MCHC RBC AUTO-ENTMCNC: 31.9 G/DL (ref 32–36)
MCV RBC AUTO: 87 FL (ref 80–100)
NITRITE UR QL STRIP.AUTO: NEGATIVE
NON HDL CHOLESTEROL: 159 MG/DL (ref 0–149)
NRBC BLD-RTO: 0 /100 WBCS (ref 0–0)
PH UR STRIP.AUTO: 7 [PH]
PLATELET # BLD AUTO: 214 X10*3/UL (ref 150–450)
POTASSIUM SERPL-SCNC: 3.9 MMOL/L (ref 3.5–5.3)
PROT SERPL-MCNC: 6.8 G/DL (ref 6.4–8.2)
PROT UR STRIP.AUTO-MCNC: NEGATIVE MG/DL
RBC # BLD AUTO: 4.81 X10*6/UL (ref 4–5.2)
RBC # UR STRIP.AUTO: NEGATIVE /UL
SODIUM SERPL-SCNC: 140 MMOL/L (ref 136–145)
SP GR UR STRIP.AUTO: 1.01
TRIGL SERPL-MCNC: 174 MG/DL (ref 0–149)
TSH SERPL-ACNC: 4.68 MIU/L (ref 0.44–3.98)
UROBILINOGEN UR STRIP.AUTO-MCNC: NORMAL MG/DL
VLDL: 35 MG/DL (ref 0–40)
WBC # BLD AUTO: 8.7 X10*3/UL (ref 4.4–11.3)

## 2024-05-11 PROCEDURE — 81003 URINALYSIS AUTO W/O SCOPE: CPT

## 2024-05-11 PROCEDURE — 84443 ASSAY THYROID STIM HORMONE: CPT

## 2024-05-11 PROCEDURE — 82306 VITAMIN D 25 HYDROXY: CPT

## 2024-05-11 PROCEDURE — 80053 COMPREHEN METABOLIC PANEL: CPT

## 2024-05-11 PROCEDURE — 85027 COMPLETE CBC AUTOMATED: CPT

## 2024-05-11 PROCEDURE — 36415 COLL VENOUS BLD VENIPUNCTURE: CPT

## 2024-05-11 PROCEDURE — 83036 HEMOGLOBIN GLYCOSYLATED A1C: CPT

## 2024-05-11 PROCEDURE — 80061 LIPID PANEL: CPT

## 2024-10-04 ENCOUNTER — HOSPITAL ENCOUNTER (OUTPATIENT)
Dept: RADIOLOGY | Facility: CLINIC | Age: 54
Discharge: HOME | End: 2024-10-04
Payer: COMMERCIAL

## 2024-10-04 VITALS — HEIGHT: 63 IN | BODY MASS INDEX: 36.14 KG/M2 | WEIGHT: 204 LBS

## 2024-10-04 DIAGNOSIS — Z12.31 ENCOUNTER FOR SCREENING MAMMOGRAM FOR MALIGNANT NEOPLASM OF BREAST: ICD-10-CM

## 2024-10-04 PROCEDURE — 77067 SCR MAMMO BI INCL CAD: CPT

## 2024-10-11 ENCOUNTER — HOSPITAL ENCOUNTER (OUTPATIENT)
Dept: RADIOLOGY | Facility: EXTERNAL LOCATION | Age: 54
Discharge: HOME | End: 2024-10-11

## 2024-11-30 DIAGNOSIS — K21.9 GASTROESOPHAGEAL REFLUX DISEASE, UNSPECIFIED WHETHER ESOPHAGITIS PRESENT: ICD-10-CM

## 2024-12-02 RX ORDER — OMEPRAZOLE 20 MG/1
20 CAPSULE, DELAYED RELEASE ORAL 2 TIMES DAILY
Qty: 180 CAPSULE | Refills: 1 | Status: SHIPPED | OUTPATIENT
Start: 2024-12-02 | End: 2025-12-02

## 2024-12-18 DIAGNOSIS — I10 BENIGN ESSENTIAL HYPERTENSION: ICD-10-CM

## 2024-12-18 RX ORDER — VERAPAMIL HYDROCHLORIDE 180 MG/1
360 TABLET, EXTENDED RELEASE ORAL NIGHTLY
Qty: 180 TABLET | Refills: 0 | Status: SHIPPED | OUTPATIENT
Start: 2024-12-18

## 2025-03-04 DIAGNOSIS — E03.9 ACQUIRED HYPOTHYROIDISM: ICD-10-CM

## 2025-03-04 RX ORDER — LEVOTHYROXINE SODIUM 75 UG/1
75 TABLET ORAL
Qty: 90 TABLET | Refills: 0 | Status: SHIPPED | OUTPATIENT
Start: 2025-03-04

## 2025-03-18 DIAGNOSIS — E03.9 ACQUIRED HYPOTHYROIDISM: Primary | ICD-10-CM

## 2025-03-18 DIAGNOSIS — I10 HYPERTENSION, UNSPECIFIED TYPE: ICD-10-CM

## 2025-03-18 DIAGNOSIS — E78.5 HYPERLIPIDEMIA WITH TARGET LOW DENSITY LIPOPROTEIN (LDL) CHOLESTEROL LESS THAN 100 MG/DL: ICD-10-CM

## 2025-04-03 LAB
ALBUMIN SERPL-MCNC: 4.5 G/DL (ref 3.6–5.1)
ALP SERPL-CCNC: 70 U/L (ref 37–153)
ALT SERPL-CCNC: 20 U/L (ref 6–29)
ANION GAP SERPL CALCULATED.4IONS-SCNC: 10 MMOL/L (CALC) (ref 7–17)
AST SERPL-CCNC: 16 U/L (ref 10–35)
BASOPHILS # BLD AUTO: 38 CELLS/UL (ref 0–200)
BASOPHILS NFR BLD AUTO: 0.5 %
BILIRUB SERPL-MCNC: 0.8 MG/DL (ref 0.2–1.2)
BUN SERPL-MCNC: 9 MG/DL (ref 7–25)
CALCIUM SERPL-MCNC: 9.3 MG/DL (ref 8.6–10.4)
CHLORIDE SERPL-SCNC: 100 MMOL/L (ref 98–110)
CHOLEST SERPL-MCNC: 243 MG/DL
CHOLEST/HDLC SERPL: 4 (CALC)
CO2 SERPL-SCNC: 29 MMOL/L (ref 20–32)
CREAT SERPL-MCNC: 0.6 MG/DL (ref 0.5–1.03)
EGFRCR SERPLBLD CKD-EPI 2021: 107 ML/MIN/1.73M2
EOSINOPHIL # BLD AUTO: 113 CELLS/UL (ref 15–500)
EOSINOPHIL NFR BLD AUTO: 1.5 %
ERYTHROCYTE [DISTWIDTH] IN BLOOD BY AUTOMATED COUNT: 12.5 % (ref 11–15)
EST. AVERAGE GLUCOSE BLD GHB EST-MCNC: 117 MG/DL
EST. AVERAGE GLUCOSE BLD GHB EST-SCNC: 6.5 MMOL/L
GLUCOSE SERPL-MCNC: 101 MG/DL (ref 65–99)
HBA1C MFR BLD: 5.7 % OF TOTAL HGB
HCT VFR BLD AUTO: 41.1 % (ref 35–45)
HDLC SERPL-MCNC: 61 MG/DL
HGB BLD-MCNC: 13.1 G/DL (ref 11.7–15.5)
LDLC SERPL CALC-MCNC: 156 MG/DL (CALC)
LYMPHOCYTES # BLD AUTO: 1485 CELLS/UL (ref 850–3900)
LYMPHOCYTES NFR BLD AUTO: 19.8 %
MAGNESIUM SERPL-MCNC: 2.3 MG/DL (ref 1.5–2.5)
MCH RBC QN AUTO: 27.2 PG (ref 27–33)
MCHC RBC AUTO-ENTMCNC: 31.9 G/DL (ref 32–36)
MCV RBC AUTO: 85.4 FL (ref 80–100)
MONOCYTES # BLD AUTO: 405 CELLS/UL (ref 200–950)
MONOCYTES NFR BLD AUTO: 5.4 %
NEUTROPHILS # BLD AUTO: 5460 CELLS/UL (ref 1500–7800)
NEUTROPHILS NFR BLD AUTO: 72.8 %
NONHDLC SERPL-MCNC: 182 MG/DL (CALC)
PLATELET # BLD AUTO: 274 THOUSAND/UL (ref 140–400)
PMV BLD REES-ECKER: 13.2 FL (ref 7.5–12.5)
POTASSIUM SERPL-SCNC: 4.4 MMOL/L (ref 3.5–5.3)
PROT SERPL-MCNC: 7.2 G/DL (ref 6.1–8.1)
RBC # BLD AUTO: 4.81 MILLION/UL (ref 3.8–5.1)
SODIUM SERPL-SCNC: 139 MMOL/L (ref 135–146)
TRIGL SERPL-MCNC: 136 MG/DL
TSH SERPL-ACNC: 1.64 MIU/L
WBC # BLD AUTO: 7.5 THOUSAND/UL (ref 3.8–10.8)

## 2025-04-09 ENCOUNTER — APPOINTMENT (OUTPATIENT)
Dept: PRIMARY CARE | Facility: CLINIC | Age: 55
End: 2025-04-09
Payer: COMMERCIAL

## 2025-04-17 DIAGNOSIS — I10 BENIGN ESSENTIAL HYPERTENSION: ICD-10-CM

## 2025-04-17 RX ORDER — VERAPAMIL HYDROCHLORIDE 180 MG/1
360 TABLET, EXTENDED RELEASE ORAL NIGHTLY
Qty: 60 TABLET | Refills: 0 | Status: SHIPPED | OUTPATIENT
Start: 2025-04-17

## 2025-04-26 ENCOUNTER — TELEMEDICINE (OUTPATIENT)
Dept: PRIMARY CARE | Facility: CLINIC | Age: 55
End: 2025-04-26
Payer: COMMERCIAL

## 2025-04-26 DIAGNOSIS — J45.909 ACUTE ASTHMATIC BRONCHITIS (HHS-HCC): Primary | ICD-10-CM

## 2025-04-26 DIAGNOSIS — J42 CHRONIC BRONCHITIS, UNSPECIFIED CHRONIC BRONCHITIS TYPE (MULTI): ICD-10-CM

## 2025-04-26 DIAGNOSIS — R05.9 COUGH, UNSPECIFIED TYPE: ICD-10-CM

## 2025-04-26 DIAGNOSIS — I10 HYPERTENSION, UNSPECIFIED TYPE: ICD-10-CM

## 2025-04-26 PROCEDURE — 99213 OFFICE O/P EST LOW 20 MIN: CPT | Performed by: INTERNAL MEDICINE

## 2025-04-26 RX ORDER — BROMPHENIRAMINE MALEATE, PSEUDOEPHEDRINE HYDROCHLORIDE, AND DEXTROMETHORPHAN HYDROBROMIDE 2; 30; 10 MG/5ML; MG/5ML; MG/5ML
5 SYRUP ORAL 4 TIMES DAILY PRN
Qty: 120 ML | Refills: 0 | Status: SHIPPED | OUTPATIENT
Start: 2025-04-26 | End: 2025-05-06

## 2025-04-26 RX ORDER — DOXYCYCLINE 100 MG/1
100 CAPSULE ORAL 2 TIMES DAILY
Qty: 20 CAPSULE | Refills: 0 | Status: SHIPPED | OUTPATIENT
Start: 2025-04-26 | End: 2025-05-06

## 2025-04-26 RX ORDER — AMOXICILLIN AND CLAVULANATE POTASSIUM 875; 125 MG/1; MG/1
875 TABLET, FILM COATED ORAL 2 TIMES DAILY
Qty: 20 TABLET | Refills: 0 | Status: SHIPPED | OUTPATIENT
Start: 2025-04-26 | End: 2025-05-06

## 2025-04-26 RX ORDER — LORATADINE 10 MG/1
10 TABLET ORAL DAILY
Qty: 30 TABLET | Refills: 0 | Status: SHIPPED | OUTPATIENT
Start: 2025-04-26 | End: 2025-07-25

## 2025-04-26 RX ORDER — BENZONATATE 100 MG/1
100 CAPSULE ORAL 2 TIMES DAILY
Qty: 60 CAPSULE | Refills: 0 | Status: SHIPPED | OUTPATIENT
Start: 2025-04-26

## 2025-04-26 RX ORDER — ALBUTEROL SULFATE 90 UG/1
2 INHALANT RESPIRATORY (INHALATION) EVERY 4 HOURS PRN
Qty: 6.7 G | Refills: 0 | Status: SHIPPED | OUTPATIENT
Start: 2025-04-26 | End: 2026-04-26

## 2025-04-26 RX ORDER — LEVOFLOXACIN 500 MG/1
500 TABLET, FILM COATED ORAL DAILY
Qty: 10 TABLET | Refills: 0 | Status: SHIPPED | OUTPATIENT
Start: 2025-04-26 | End: 2025-04-26 | Stop reason: SINTOL

## 2025-04-26 ASSESSMENT — ENCOUNTER SYMPTOMS
SORE THROAT: 1
WEIGHT LOSS: 0
HEADACHES: 0
MYALGIAS: 0
COUGH: 1
SWEATS: 0
RHINORRHEA: 0
SHORTNESS OF BREATH: 0
FEVER: 0
WHEEZING: 0
HEMOPTYSIS: 0
CHILLS: 0
HEARTBURN: 0

## 2025-04-26 NOTE — PROGRESS NOTES
Subjective   Patient ID: Bren Johnson is a 54 y.o. female who presents for Illness.    Mrs. Johnson is a 54-year-old [place] lady today came here for cough, yellow sputum, sinus congestion, bronchitis, headache going on for the last several days.  Over-the-counter medications not helping.   has the same problem.  She came here for follow-up.    I have personally reviewed the patient's Past Medical History, Medications, Allergies, Social History, and Family History in the EMR.    Cough  This is a new problem. The current episode started yesterday. The problem has been unchanged. The problem occurs hourly. The cough is Non-productive. Associated symptoms include nasal congestion, postnasal drip and a sore throat. Pertinent negatives include no chest pain, chills, ear congestion, ear pain, fever, headaches, heartburn, hemoptysis, myalgias, rash, rhinorrhea, shortness of breath, sweats, weight loss or wheezing. The symptoms are aggravated by lying down.      Review of Systems   Constitutional:  Negative for chills, fever and weight loss.   HENT:  Positive for postnasal drip and sore throat. Negative for ear pain and rhinorrhea.    Respiratory:  Positive for cough. Negative for hemoptysis, shortness of breath and wheezing.    Cardiovascular:  Negative for chest pain.   Gastrointestinal:  Negative for heartburn.   Musculoskeletal:  Negative for myalgias.   Skin:  Negative for rash.   Neurological:  Negative for headaches.   All other systems reviewed and are negative.    Objective   Virtual exam. There were no vitals taken for this visit.    Physical Exam  HENT:      Nose: Congestion present.      Comments: Postnasal drip.     Mouth/Throat:      Comments: Throat congested.  Pulmonary:      Breath sounds: Wheezing present.   Musculoskeletal:      Right lower leg: No edema.      Left lower leg: No edema.     LAB WORK:  Laboratory testing discussed.    Assessment/Plan   Problem List Items Addressed This Visit     None  Visit Diagnoses         Codes      Acute asthmatic bronchitis ([facility])    -  Primary J45.909      Cough, unspecified type     R05.9    Relevant Medications    brompheniramine-pseudoeph-DM 2-30-10 mg/5 mL syrup    loratadine (Claritin) 10 mg tablet    albuterol (Proventil HFA) 90 mcg/actuation inhaler    benzonatate (Tessalon) 100 mg capsule    amoxicillin-clavulanate (Augmentin) 875-125 mg tablet    doxycycline (Vibramycin) 100 mg capsule      Hypertension, unspecified type     I10        1. Acute asthmatic bronchitis and cough.  Levaquin, Claritin, Flonase, Tessalon, and albuterol inhaler.  2. Follow up in two weeks if not better.  3. Blood pressure, okay.    Scribe Attestation  By signing my name below, IAlice Scribe attest that this documentation has been prepared under the direction and in the presence of Asher Alexander MD.

## 2025-04-28 PROBLEM — J42 CHRONIC BRONCHITIS, UNSPECIFIED CHRONIC BRONCHITIS TYPE (MULTI): Status: ACTIVE | Noted: 2025-04-28

## 2025-05-31 ENCOUNTER — OFFICE VISIT (OUTPATIENT)
Dept: PRIMARY CARE | Facility: CLINIC | Age: 55
End: 2025-05-31
Payer: COMMERCIAL

## 2025-05-31 VITALS
SYSTOLIC BLOOD PRESSURE: 124 MMHG | WEIGHT: 209 LBS | DIASTOLIC BLOOD PRESSURE: 74 MMHG | HEIGHT: 62 IN | BODY MASS INDEX: 38.46 KG/M2

## 2025-05-31 DIAGNOSIS — T78.40XA ALLERGY, INITIAL ENCOUNTER: Primary | ICD-10-CM

## 2025-05-31 DIAGNOSIS — Z13.220 LIPID SCREENING: ICD-10-CM

## 2025-05-31 DIAGNOSIS — E03.9 ACQUIRED HYPOTHYROIDISM: ICD-10-CM

## 2025-05-31 DIAGNOSIS — R05.9 COUGH, UNSPECIFIED TYPE: ICD-10-CM

## 2025-05-31 DIAGNOSIS — I10 BENIGN ESSENTIAL HYPERTENSION: ICD-10-CM

## 2025-05-31 DIAGNOSIS — E11.9 TYPE 2 DIABETES MELLITUS WITHOUT COMPLICATION, WITH LONG-TERM CURRENT USE OF INSULIN: ICD-10-CM

## 2025-05-31 DIAGNOSIS — Z79.4 TYPE 2 DIABETES MELLITUS WITHOUT COMPLICATION, WITH LONG-TERM CURRENT USE OF INSULIN: ICD-10-CM

## 2025-05-31 PROCEDURE — 3008F BODY MASS INDEX DOCD: CPT | Performed by: INTERNAL MEDICINE

## 2025-05-31 PROCEDURE — 99214 OFFICE O/P EST MOD 30 MIN: CPT | Performed by: INTERNAL MEDICINE

## 2025-05-31 PROCEDURE — 3074F SYST BP LT 130 MM HG: CPT | Performed by: INTERNAL MEDICINE

## 2025-05-31 PROCEDURE — 3078F DIAST BP <80 MM HG: CPT | Performed by: INTERNAL MEDICINE

## 2025-05-31 RX ORDER — BLOOD-GLUCOSE SENSOR
EACH MISCELLANEOUS
Qty: 3 EACH | Refills: 5 | COMMUNITY
Start: 2025-05-31

## 2025-05-31 RX ORDER — VERAPAMIL HYDROCHLORIDE 180 MG/1
360 TABLET, EXTENDED RELEASE ORAL NIGHTLY
Qty: 180 TABLET | Refills: 1 | Status: SHIPPED | OUTPATIENT
Start: 2025-05-31 | End: 2025-08-29

## 2025-05-31 RX ORDER — LORATADINE 10 MG/1
10 TABLET ORAL DAILY
Qty: 90 TABLET | Refills: 1 | Status: SHIPPED | OUTPATIENT
Start: 2025-05-31 | End: 2025-08-29

## 2025-05-31 RX ORDER — METFORMIN HYDROCHLORIDE 500 MG/1
500 TABLET ORAL
Qty: 60 TABLET | Refills: 0 | Status: SHIPPED | OUTPATIENT
Start: 2025-05-31

## 2025-05-31 RX ORDER — LEVOTHYROXINE SODIUM 75 UG/1
75 TABLET ORAL
Qty: 90 TABLET | Refills: 1 | Status: SHIPPED | OUTPATIENT
Start: 2025-05-31

## 2025-05-31 NOTE — PROGRESS NOTES
"Subjective   Patient ID: Bren Johnson is a 54 y.o. female who presents for Follow-up (on various conditions).    Mrs. Bren Johnson today came here for follow-up on various conditions.  She was accompanied by her  with her permission.  Overall, she is doing okay.  Appetite and weight are okay.  No problem.    I have personally reviewed the patient's Past Medical History, Medications, Allergies, Social History, and Family History in the EMR.    Review of Systems   All other systems reviewed and are negative.    Objective   /74   Ht 1.575 m (5' 2\")   Wt 94.8 kg (209 lb)   BMI 38.23 kg/m²     Physical Exam  Vitals reviewed.   Cardiovascular:      Heart sounds: Normal heart sounds, S1 normal and S2 normal. No murmur heard.     No friction rub.   Pulmonary:      Effort: Pulmonary effort is normal.      Breath sounds: Normal breath sounds and air entry.   Abdominal:      Palpations: There is no hepatomegaly, splenomegaly or mass.   Musculoskeletal:      Right lower leg: No edema.      Left lower leg: No edema.   Lymphadenopathy:      Lower Body: No right inguinal adenopathy. No left inguinal adenopathy.   Neurological:      Cranial Nerves: Cranial nerves 2-12 are intact.      Sensory: No sensory deficit.      Motor: Motor function is intact.      Deep Tendon Reflexes: Reflexes are normal and symmetric.     LAB WORK:  Laboratory testing discussed.    Assessment/Plan   Problem List Items Addressed This Visit           ICD-10-CM    Benign essential hypertension I10    Relevant Medications    verapamil SR (Calan-SR) 180 mg ER tablet    Hypothyroidism E03.9    Relevant Medications    levothyroxine (Synthroid, Levoxyl) 75 mcg tablet     Other Visit Diagnoses         Codes      Allergy, initial encounter    -  Primary T78.40XA      Cough, unspecified type     R05.9    Relevant Medications    loratadine (Claritin) 10 mg tablet      Type 2 diabetes mellitus without complication, with long-term current use of " insulin     E11.9, Z79.4    Relevant Medications    metFORMIN (Glucophage) 500 mg tablet    blood-glucose sensor (FreeStyle Herrera 3 Plus Sensor) device      Lipid screening     Z13.220        1. Type 2 diabetes, new onset.  Her daughter is a doctor and we mutually agreed to start metformin 500 mg twice a day, Herrera 3 plus sample given for her to monitor her sugar.  2. Hypertension, excellent.  3. Allergies.  Claritin helping very good.  4. ______ okay.  5. Cholesterol.  Diet and exercise.  6. Follow up probably in a month and probably if situation permits she will think about starting Mounjaro, she is not sure about it.  7. I shall see her back in a month.    Scribe Attestation  By signing my name below, I, Katharina oMlina attest that this documentation has been prepared under the direction and in the presence of Asher Alexander MD.     All medical record entries made by the scribe were personally dictated by me I have reviewed the chart and agree the record accurately reflects my personal performance of his history physical examination and management

## 2025-06-05 DIAGNOSIS — K21.9 GASTROESOPHAGEAL REFLUX DISEASE, UNSPECIFIED WHETHER ESOPHAGITIS PRESENT: ICD-10-CM

## 2025-06-05 RX ORDER — OMEPRAZOLE 20 MG/1
20 CAPSULE, DELAYED RELEASE ORAL 2 TIMES DAILY
Qty: 180 CAPSULE | Refills: 1 | Status: SHIPPED | OUTPATIENT
Start: 2025-06-05 | End: 2026-06-05

## 2025-08-27 DIAGNOSIS — K21.9 GASTROESOPHAGEAL REFLUX DISEASE, UNSPECIFIED WHETHER ESOPHAGITIS PRESENT: Primary | ICD-10-CM

## 2025-08-28 RX ORDER — FAMOTIDINE 20 MG/1
20 TABLET, FILM COATED ORAL NIGHTLY
Qty: 90 TABLET | Refills: 1 | Status: SHIPPED | OUTPATIENT
Start: 2025-08-28